# Patient Record
Sex: MALE | Race: WHITE | HISPANIC OR LATINO | Employment: FULL TIME | URBAN - METROPOLITAN AREA
[De-identification: names, ages, dates, MRNs, and addresses within clinical notes are randomized per-mention and may not be internally consistent; named-entity substitution may affect disease eponyms.]

---

## 2023-10-30 ENCOUNTER — HOSPITAL ENCOUNTER (EMERGENCY)
Facility: HOSPITAL | Age: 43
Discharge: HOME | End: 2023-10-30
Attending: EMERGENCY MEDICINE
Payer: OTHER MISCELLANEOUS

## 2023-10-30 ENCOUNTER — APPOINTMENT (EMERGENCY)
Dept: RADIOLOGY | Facility: HOSPITAL | Age: 43
End: 2023-10-30
Payer: OTHER MISCELLANEOUS

## 2023-10-30 VITALS
RESPIRATION RATE: 18 BRPM | OXYGEN SATURATION: 100 % | DIASTOLIC BLOOD PRESSURE: 78 MMHG | TEMPERATURE: 97.8 F | HEIGHT: 72 IN | HEART RATE: 77 BPM | SYSTOLIC BLOOD PRESSURE: 121 MMHG

## 2023-10-30 DIAGNOSIS — S49.90XA SHOULDER INJURY: Primary | ICD-10-CM

## 2023-10-30 LAB
ATRIAL RATE: 79 BPM
ATRIAL RATE: 79 BPM
P AXIS: 54 DEGREES
P AXIS: 57 DEGREES
PR INTERVAL: 160 MS
PR INTERVAL: 162 MS
QRS AXIS: 90 DEGREES
QRS AXIS: 92 DEGREES
QRSD INTERVAL: 92 MS
QRSD INTERVAL: 92 MS
QT INTERVAL: 382 MS
QT INTERVAL: 382 MS
QTC INTERVAL: 438 MS
QTC INTERVAL: 438 MS
T WAVE AXIS: 57 DEGREES
T WAVE AXIS: 59 DEGREES
VENTRICULAR RATE: 79 BPM
VENTRICULAR RATE: 79 BPM

## 2023-10-30 PROCEDURE — 99284 EMERGENCY DEPT VISIT MOD MDM: CPT

## 2023-10-30 PROCEDURE — 93005 ELECTROCARDIOGRAM TRACING: CPT

## 2023-10-30 PROCEDURE — 99283 EMERGENCY DEPT VISIT LOW MDM: CPT

## 2023-10-30 PROCEDURE — 73030 X-RAY EXAM OF SHOULDER: CPT

## 2023-10-30 PROCEDURE — 93010 ELECTROCARDIOGRAM REPORT: CPT | Performed by: INTERNAL MEDICINE

## 2023-10-30 RX ORDER — METHOCARBAMOL 500 MG/1
500 TABLET, FILM COATED ORAL 2 TIMES DAILY
Qty: 20 TABLET | Refills: 0 | Status: SHIPPED | OUTPATIENT
Start: 2023-10-30

## 2023-10-30 RX ORDER — LIDOCAINE 50 MG/G
1 PATCH TOPICAL ONCE
Status: DISCONTINUED | OUTPATIENT
Start: 2023-10-30 | End: 2023-10-30 | Stop reason: HOSPADM

## 2023-10-30 RX ORDER — METHOCARBAMOL 500 MG/1
500 TABLET, FILM COATED ORAL ONCE
Status: COMPLETED | OUTPATIENT
Start: 2023-10-30 | End: 2023-10-30

## 2023-10-30 RX ORDER — NAPROXEN 250 MG/1
250 TABLET ORAL ONCE
Status: COMPLETED | OUTPATIENT
Start: 2023-10-30 | End: 2023-10-30

## 2023-10-30 RX ADMIN — LIDOCAINE 1 PATCH: 50 PATCH CUTANEOUS at 12:53

## 2023-10-30 RX ADMIN — NAPROXEN 250 MG: 250 TABLET ORAL at 12:53

## 2023-10-30 RX ADMIN — METHOCARBAMOL TABLETS 500 MG: 500 TABLET, COATED ORAL at 12:53

## 2023-10-30 NOTE — Clinical Note
Staci Gomez was seen and treated in our emergency department on 10/30/2023. No work until cleared by Family Doctor/Orthopedics        Diagnosis:     Davidon  . He may return on this date: If you have any questions or concerns, please don't hesitate to call.       Jermaine Shah    ______________________________           _______________          _______________  Hospital Representative                              Date                                Time

## 2023-10-30 NOTE — Clinical Note
Vergia Drop was seen and treated in our emergency department on 10/30/2023. Diagnosis:     Gaviota Aveyr  may return to work on return date. He may return on this date: 10/31/2023         If you have any questions or concerns, please don't hesitate to call.       Estefani Loamx    ______________________________           _______________          _______________  Hospital Representative                              Date                                Time

## 2023-10-30 NOTE — ED PROVIDER NOTES
History  Chief Complaint   Patient presents with    Shoulder Injury     Pt reports L shoulder pain, describes as throbbing, starting this morning after lifting heavy boxes at work. Describes pain also in back near shoulder blade and L sided chest     24-year-old male presents to the ER for evaluation of shoulder pain. Patient stated around 10 AM this morning he was lifting boxes above his head when that heavy box fell backwards onto his left shoulder causing pain. Patient has pain primarily anteriorly. Worse with range of motion. Pain is described as constant and with intermittent pins-and-needles down to his fingers. Denies ecchymosis, erythema, noted deformity. No medication given prior to arrival.  Patient is right-hand dominant. History provided by:  Patient  Shoulder Injury  Associated symptoms: no back pain        None       Past Medical History:   Diagnosis Date    Depression        History reviewed. No pertinent surgical history. History reviewed. No pertinent family history. I have reviewed and agree with the history as documented. E-Cigarette/Vaping     E-Cigarette/Vaping Substances     Social History     Tobacco Use    Smoking status: Never     Passive exposure: Current    Smokeless tobacco: Never       Review of Systems   Respiratory:  Negative for cough and shortness of breath. Cardiovascular:  Negative for chest pain and palpitations. Gastrointestinal:  Negative for abdominal pain, nausea and vomiting. Musculoskeletal:  Positive for arthralgias (left shoulder). Negative for back pain. Skin:  Negative for color change and rash. Neurological:  Negative for dizziness, syncope and headaches. All other systems reviewed and are negative. Physical Exam  Physical Exam  Vitals and nursing note reviewed. Constitutional:       General: He is not in acute distress. Appearance: He is well-developed. HENT:      Head: Normocephalic and atraumatic.       Right Ear: External ear normal.      Left Ear: External ear normal.   Eyes:      Conjunctiva/sclera: Conjunctivae normal.   Cardiovascular:      Rate and Rhythm: Normal rate and regular rhythm. Pulses: Normal pulses. Heart sounds: Normal heart sounds. Pulmonary:      Effort: Pulmonary effort is normal. No respiratory distress. Breath sounds: Normal breath sounds. Abdominal:      Palpations: Abdomen is soft. Musculoskeletal:         General: No swelling. Left shoulder: Tenderness present. No swelling or deformity. Decreased range of motion. Normal strength. Normal pulse. Cervical back: Neck supple. Skin:     General: Skin is warm and dry. Capillary Refill: Capillary refill takes less than 2 seconds. Neurological:      Mental Status: He is alert and oriented to person, place, and time. Mental status is at baseline. Psychiatric:         Mood and Affect: Mood normal.         Behavior: Behavior normal.         Vital Signs  ED Triage Vitals [10/30/23 1130]   Temperature Pulse Respirations Blood Pressure SpO2   97.8 °F (36.6 °C) 77 18 144/76 100 %      Temp Source Heart Rate Source Patient Position - Orthostatic VS BP Location FiO2 (%)   Temporal Monitor Sitting Left arm --      Pain Score       8           Vitals:    10/30/23 1130 10/30/23 1335   BP: 144/76 121/78   Pulse: 77 77   Patient Position - Orthostatic VS: Sitting          Visual Acuity      ED Medications  Medications   methocarbamol (ROBAXIN) tablet 500 mg (500 mg Oral Given 10/30/23 1253)   naproxen (NAPROSYN) tablet 250 mg (250 mg Oral Given 10/30/23 1253)       Diagnostic Studies  Results Reviewed       None                   XR shoulder 2+ views LEFT   Final Result by Olga Olivas MD (10/30 1610)      No acute osseous abnormality. I have personally reviewed this study including all images.  / R.J.F. Resident: Miguel A Gutierrez, the attending radiologist, have reviewed the images and agree with the final report above. Workstation performed: [id number]                    Procedures  Procedures         ED Course                               SBIRT 22yo+      Flowsheet Row Most Recent Value   Initial Alcohol Screen: US AUDIT-C     1. How often do you have a drink containing alcohol? 0 Filed at: 10/30/2023 1132   2. How many drinks containing alcohol do you have on a typical day you are drinking? 0 Filed at: 10/30/2023 1132   3a. Male UNDER 65: How often do you have five or more drinks on one occasion? 0 Filed at: 10/30/2023 1132   3b. FEMALE Any Age, or MALE 65+: How often do you have 4 or more drinks on one occassion? 0 Filed at: 10/30/2023 1132   Audit-C Score 0 Filed at: 10/30/2023 1132   DORINDA: How many times in the past year have you. .. Used an illegal drug or used a prescription medication for non-medical reasons? Never Filed at: 10/30/2023 1132                      Medical Decision Making  27-year-old male patient presents to the ER for evaluation of shoulder pain. Patient stated that he was at work approximately 10 AM when he was lifting heavy boxes and a heavy box fell back onto left shoulder causing anterior shoulder pain. Patient had an x-ray which showed no osseous abnormalities. Advised patient to follow-up with orthopedics for further evaluation of possible ligaments injury. Tylenol and Motrin for pain. Robaxin given for muscle spasm and muscle pain. Return the ER if symptoms worsens or questions or concerns arise at home. Amount and/or Complexity of Data Reviewed  Radiology: ordered. Risk  Prescription drug management.              Disposition  Final diagnoses:   Shoulder injury     Time reflects when diagnosis was documented in both MDM as applicable and the Disposition within this note       Time User Action Codes Description Comment    10/30/2023  1:34 PM Mei Ceron Add [S49.90XA] Shoulder injury           ED Disposition       ED Disposition   Discharge    Condition   Stable    Date/Time   Mon Oct 30, 2023 [address] [place] [address] Drive discharge to home/self care. Follow-up Information       Follow up With Specialties Details Why Contact Info Additional [address], DO Orthopedic Surgery   [address] [facility]  [address]  [facility] [address]  [phone]       [address] [place] [address] Emergency Department Emergency Medicine   [facility] [id number] [address] [place] [address] Emergency Department, [place], [place], [facility]            There are no discharge medications for this patient. No discharge procedures on file.     PDMP Review       None            ED Provider  Electronically Signed by             ODILIA Villa  10/30/23 5992

## 2023-11-16 ENCOUNTER — APPOINTMENT (OUTPATIENT)
Dept: RADIOLOGY | Facility: CLINIC | Age: 43
End: 2023-11-16

## 2023-11-16 VITALS
WEIGHT: 207.2 LBS | HEIGHT: 72 IN | BODY MASS INDEX: 28.06 KG/M2 | DIASTOLIC BLOOD PRESSURE: 80 MMHG | SYSTOLIC BLOOD PRESSURE: 133 MMHG | HEART RATE: 89 BPM

## 2023-11-16 DIAGNOSIS — M62.838 TRAPEZIUS MUSCLE SPASM: ICD-10-CM

## 2023-11-16 DIAGNOSIS — G54.9 NERVE ROOT IRRITATION: ICD-10-CM

## 2023-11-16 DIAGNOSIS — M62.838 TRAPEZIUS MUSCLE SPASM: Primary | ICD-10-CM

## 2023-11-16 PROCEDURE — 99204 OFFICE O/P NEW MOD 45 MIN: CPT | Performed by: ORTHOPAEDIC SURGERY

## 2023-11-16 PROCEDURE — 72040 X-RAY EXAM NECK SPINE 2-3 VW: CPT

## 2023-11-16 RX ORDER — METHYLPREDNISOLONE 4 MG/1
TABLET ORAL
Qty: 21 TABLET | Refills: 0 | Status: SHIPPED | OUTPATIENT
Start: 2023-11-16

## 2023-11-16 NOTE — PROGRESS NOTES
Patient Name:  Divya Ozuna  MRN:  [id number]    [address] [facility]     1. Trapezius muscle spasm  -     Ambulatory Referral to Occupational Medicine; Future  -     Ambulatory Referral to Physical Therapy; Future    2. Nerve root irritation  -     Ambulatory Referral to Occupational Medicine; Future  -     Ambulatory Referral to Physical Therapy; Future      Upper trapezius muscle spasm and suspected cervical nerve root irritation s/p work related injury 10/30/23  The patient's x-rays were reviewed today. The patient was referred to physical therapy. He is encouraged to perform home exercises. Home exercises were provided and demonstrated in the office today. The patient was referred to occupational medicine for evaluation and further work restrictions if needed. The following work restrictions were provided: may return to restricted duty on 11/20/23. Limitations include: no lifting greater than 5 lbs above his waist, no overhead lifting, no pushing or pulling greater than 5 lbs, no climbing ladders for the following 2 weeks. In 2 weeks no lifting greater than 30 lbs. In 4 weeks resume full unrestricted duty. The patient was provided a Medrol Dosepak. Potential risks and benefits were discussed in detail. He was advised not to use NSAID's while taking. I will see the patient back in approximately 6-8 weeks for re-evaluation. If his symptoms have resolved at this point he may cancel his appointment. Chief Complaint     Left shoulder pain    History of the Present Illness     Mauricio Webb is a RHD 38 y.o. male with Left shoulder pain. The patient was at work lifting heavy boxes overhead and noted mild discomfort. He notes he reported to his supervisor following this stating there was discomfort in his shoulder. He continued to work. He lifted another heavy box and had sharp pains into his shoulder.  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 Boxes can be upwards of 50-75 lbs. The patient's injury occurred on 10/30/23. He was then treated in the emergency room where he was provided methocarbamol upon discharge. He has not returned to work since this incident. Pain is located at the anterior shoulder. He has pain at the inferior scapula. He notes numbness and tingling into his digits. Pain is rated 8/10. Pain has not improved. Review of Systems     Review of Systems   Constitutional:  Negative for appetite change and unexpected weight change. HENT:  Negative for congestion and trouble swallowing. Eyes:  Negative for visual disturbance. Respiratory:  Negative for cough and shortness of breath. Cardiovascular:  Negative for chest pain and palpitations. Gastrointestinal:  Negative for nausea and vomiting. Endocrine: Negative for cold intolerance and heat intolerance. Musculoskeletal:  Positive for arthralgias. Negative for joint swelling and myalgias. Skin:  Negative for rash. Neurological:  Negative for numbness. Physical Exam     /80   Pulse 89   Ht 6' (1.829 m)   Wt 94 kg (207 lb 3.2 oz)   BMI 28.10 kg/m²     Left  Shoulder: Active range of motion   100 degrees forward flexion  100 degrees abduction  60 degrees external rotation   L1 internal rotation    Passive range of motion   160 degrees of forward flexion     There is 5/5 strength with supraspinatus testing. There is 5/5 strength with infraspinatus testing. There is 5/5 strength with subscapularis testing. The patient is neurovascularly intact distally in the extremity. Left Elbow    Active range of motion  0 to 130 degrees  Full composite fist  Neurovascularly intact distally    Cervical Spine:   Active range of motion mild restriction with rotation to the right. There is no midline tenderness. Trapezius and rhomboid tenderness. There is on left paraspinal hypertonicity and tenderness.     Sensation decreased to light touch C5 through C7 dermatomes left upper extremity. Sensation intact to light touch C5 through T1 dermatomes right upper extremity. Left Motor: 5/5 biceps, 5/5 triceps, 5/5 wrist extension, 5/5 finger flexion, 5/5 finger abduction   Right Motor: 5/5 biceps, 5/5 triceps, 5/5 wrist extension, 5/5 finger flexion, 5/5 finger abduction   Spurling test is  negative  Suresh's sign negative      Eyes:  Anicteric sclerae. Neck:  Supple. Lungs:  Normal respiratory effort. Cardiovascular:  Capillary refill is less than 2 seconds. Skin:  Intact without erythema. Neurologic:  Sensation grossly intact to light touch. Psychiatric:  Mood and affect are appropriate. Data Review     I have personally reviewed pertinent films in PACS, and my interpretation follows:    X-rays taken 10/30/23 of Left shoulder demonstrate well preserved joint spaces. No acute fracture or osseous abnormality. X-rays taken 11/16/23 of cervical spine demonstrate anterior osteophyte at C5-6. Mild degenerative changes C5-6 and C6-7. Mild loss of lordosis with subtle straightening. No acute fracture or osseous abnormality noted. Past Medical History:   Diagnosis Date    Depression        History reviewed. No pertinent surgical history. No Known Allergies    Current Outpatient Medications on File Prior to Visit   Medication Sig Dispense Refill    methocarbamol (ROBAXIN) 500 mg tablet Take 1 tablet (500 mg total) by mouth 2 (two) times a day 20 tablet 0     No current facility-administered medications on file prior to visit. Social History     Tobacco Use    Smoking status: Never     Passive exposure: Current    Smokeless tobacco: Never       History reviewed. No pertinent family history. Procedures Performed     Procedures  None performed.        Beata Laboy  Scribe Attestation      I,:  Beata Laboy am acting as a scribe while in the presence of the attending physician.:       I,:  Alpesh Morin DO personally performed the services described in this documentation    as scribed in my presence.:

## 2023-11-16 NOTE — LETTER
November 16, 2023     Patient: Julio Roberts  YOB: 1980  Date of Visit: 11/16/2023      To Whom it May Concern:    Renetta Huan is under my professional care. Gladys Ordonez was seen in my office on 11/16/2023. Gladys Ordonez may return to restricted duty on 11/20/23. Limitations include: no lifting greater than 5 lbs above his waist, no overhead lifting, no pushing or pulling greater than 5 lbs, no climbing ladders for the following 2 weeks. In 2 weeks no lifting greater than 30 lbs. In 4 weeks resume full unrestricted duty. If you have any questions or concerns, please don't hesitate to call.          Sincerely,          Mayo Salvador DO        CC: No Recipients

## 2023-11-28 ENCOUNTER — EVALUATION (OUTPATIENT)
Dept: PHYSICAL THERAPY | Facility: CLINIC | Age: 43
End: 2023-11-28
Payer: OTHER MISCELLANEOUS

## 2023-11-28 DIAGNOSIS — M25.512 ACUTE PAIN OF LEFT SHOULDER: ICD-10-CM

## 2023-11-28 DIAGNOSIS — M62.838 TRAPEZIUS MUSCLE SPASM: Primary | ICD-10-CM

## 2023-11-28 DIAGNOSIS — G54.9 NERVE ROOT IRRITATION: ICD-10-CM

## 2023-11-28 PROCEDURE — 97140 MANUAL THERAPY 1/> REGIONS: CPT | Performed by: PHYSICAL THERAPIST

## 2023-11-28 PROCEDURE — 97112 NEUROMUSCULAR REEDUCATION: CPT | Performed by: PHYSICAL THERAPIST

## 2023-11-28 PROCEDURE — 97161 PT EVAL LOW COMPLEX 20 MIN: CPT | Performed by: PHYSICAL THERAPIST

## 2023-11-28 NOTE — PROGRESS NOTES
PT Evaluation     Today's date: 2023  Patient name: Miesha Lockhart  : 1980  MRN: [id number]  Referring provider: John Arevalo DO  Dx:   Encounter Diagnosis     ICD-10-CM    1. Trapezius muscle spasm  M62.838 Ambulatory Referral to Physical Therapy      2. Nerve root irritation  G54.9 Ambulatory Referral to Physical Therapy      3. Acute pain of left shoulder  M25.512           Start Time: 1500  Stop Time: 1545  Total time in clinic (min): 45 minutes    Assessment  Assessment details: Pt is a 40 y/o male presenting to physical therapy with L shoulder/cervical pain with numbness/tingling down the LUE. Upon examination, pt presents with impairments of decreased strength, decreased ROM, and increased pain of pt's L shoulder and cervical spine resulting in limitations of self-care/ADLs, work activities, lifting objects, and reaching overhead. Pt plan of care will focus on improving strength and ROM of pt's L shoulder and cervical spine as well as decreasing pain and improving tolerance to functional activities. Pt would benefit from skilled physical therapy to facilitate a return to his prior level of function. Impairments: abnormal or restricted ROM, activity intolerance, impaired physical strength, lacks appropriate home exercise program and pain with function  Functional limitations: self-care/ADLs, work activities, lifting objects, and reaching overheadUnderstanding of Dx/Px/POC: good   Prognosis: good    Goals  STG - 4 weeks  1. Pt will have a subjective decrease in pain of pt's L shoulder by 2 levels or more during performance of reaching overhead  2. Pt will demonstrate WNL AROM of pt's L shoulder in all planes to facilitate a return to his prior level of function    LTG - 8 weeks  1. Pt will demonstrate 4+/5 gross strength of his L shoulder in all planes to facilitate a return to his prior level of function  2.  Pt's FOTO score will improve by 10 points or better to facilitate a return to pt's prior level of function. Plan  Patient would benefit from: PT eval and skilled physical therapy  Planned modality interventions: cryotherapy, thermotherapy: hydrocollator packs and unattended electrical stimulation  Planned therapy interventions: activity modification, ADL training, behavior modification, flexibility, functional ROM exercises, graded exercise, home exercise program, IASTM, joint mobilization, kinesiology taping, massage, Oconnell taping, manual therapy, neuromuscular re-education, patient education, postural training, self care, strengthening, stretching, therapeutic activities and therapeutic exercise  Frequency: 2x week  Duration in weeks: 8  Plan of Care beginning date: 2023  Plan of Care expiration date: 2024  Treatment plan discussed with: patient        Subjective Evaluation    History of Present Illness  Mechanism of injury: Pt is a 40 y/o male presenting to physical therapy with L shoulder/cervical pain with numbness/tingling down the LUE. On , pt reports he was putting a heavy box overhead when he felt a significant pain into his L shoulder causing his LUE to lock. Pt reports going to the ER that same day where he received muscle relaxers and x-ray at the hospital. Pt reports then going to see Dr. Filiberto Hudson two weeks later where he received a steroid. Pt reports he will have increased pain with reaching back, overhead, and laying on L or R side. Pt reports he will have numbness/tingling in his LUE that will increase with movement. Pt reports he will have decreased pain with rest and small movements of his LUE. Pt reports he is also having weakness throughout his LUE.    Patient Goals  Patient goals for therapy: decreased pain, increased motion and increased strength    Pain  Current pain ratin  At best pain ratin  At worst pain ratin  Location: L shoulder  Quality: dull ache and sharp  Relieving factors: rest and medications  Aggravating factors: overhead activity and lifting    Treatments  Previous treatment: medication  Current treatment: medication and physical therapy        Objective     Palpation   Left   Tenderness of the lower trapezius, middle trapezius, rhomboids and upper trapezius. Tenderness     Left Shoulder   Tenderness in the acromion, coracoid process, medial scapula and supraspinatus tendon.      Neurological Testing     Sensation     Shoulder   Left Shoulder   Intact: light touch, pin prick and sharp/dull discrimination    Right Shoulder   Intact: Light touch, pin prick and sharp/dull discrimination    Reflexes   Left   Biceps (C5/C6): normal (2+)  Brachioradialis (C6): normal (2+)  Triceps (C7): normal (2+)    Right   Biceps (C5/C6): normal (2+)  Brachioradialis (C6): normal (2+)  Triceps (C7): normal (2+)    Active Range of Motion   Cervical/Thoracic Spine       Cervical    Flexion: 60 degrees  Restriction level: minimal  Extension: 60 degrees     with pain Restriction level: minimal  Left lateral flexion: 20 degrees     Restriction level: minimal  Right lateral flexion: 20 degrees     with pain Restriction level minimal  Left rotation: 70 degrees Restriction level: minimal  Right rotation: 70 degrees    Restriction level: minimal  Left Shoulder   Flexion: 135 degrees with pain  Abduction: 105 degrees with pain  External rotation 45°: 55 degrees with pain  External rotation BTH: C4 with pain  Internal rotation 45°: 65 degrees with pain  Internal rotation BTB: sacrum with pain    Right Shoulder   Normal active range of motion    Scapular Mobility   Left Shoulder   Scapular mobility: fair  Scapular Mobility with Shoulder to 90° FF   Upward rotation: inadequate    Scapular Mobility beyond 90° FF   Upward rotation: inadequate    Strength/Myotome Testing   Cervical Spine   Neck extension: 4-  Neck flexion: 4-    Left   Neck lateral flexion (C3): 4-    Right   Neck lateral flexion (C3): 4-    Left Shoulder     Planes of Motion Flexion: 3-   Abduction: 2+   External rotation at 0°: 3+   Internal rotation at 0°: 3     Left Elbow   Flexion: 3+  Extension: 3+             Precautions: N/A    POC expires Unit limit Auth Expiration date PT/OT + Visit Limit?    1/23 N/A 1/23 BOMN                 Visit/Unit Tracking  11/28                                            FOTO        Manuals 11/28            GH distraction STEPHANI            L post/inf shoulder mobs STEPHANI            L scapula PROM STEPHANI                         Neuro Re-Ed             Education on POC, diagnosis, and HEP 5'            Scapular retraction 1x10            Cervical retraction 1x10                                                                Ther Ex             Pullies flex/abd 2' ea            Wall slides 1x10                                                                                          Ther Activity                                       Gait Training                                       Modalities

## 2023-11-30 ENCOUNTER — OFFICE VISIT (OUTPATIENT)
Dept: PHYSICAL THERAPY | Facility: CLINIC | Age: 43
End: 2023-11-30
Payer: OTHER MISCELLANEOUS

## 2023-11-30 DIAGNOSIS — M62.838 TRAPEZIUS MUSCLE SPASM: Primary | ICD-10-CM

## 2023-11-30 DIAGNOSIS — M25.512 ACUTE PAIN OF LEFT SHOULDER: ICD-10-CM

## 2023-11-30 DIAGNOSIS — G54.9 NERVE ROOT IRRITATION: ICD-10-CM

## 2023-11-30 PROCEDURE — 97112 NEUROMUSCULAR REEDUCATION: CPT

## 2023-11-30 PROCEDURE — 97140 MANUAL THERAPY 1/> REGIONS: CPT

## 2023-11-30 PROCEDURE — 97110 THERAPEUTIC EXERCISES: CPT

## 2023-11-30 NOTE — PROGRESS NOTES
Daily Note     Today's date: 2023  Patient name: Lokesh Lombardi  : 1980  MRN: [id number]  Referring provider: Catherine Moon DO  Dx:   Encounter Diagnosis     ICD-10-CM    1. Trapezius muscle spasm  M62.838       2. Nerve root irritation  G54.9       3. Acute pain of left shoulder  M25.512                      Subjective: Pt reports not feeling too much right now. Objective: See treatment diary below      Assessment: Tolerated treatment fair. He reports he feels increase in scapular pain along the inferior angle of the scapula. Attempted median nerve glides a couple different ways and the only way that didn't cause increased pain was with his shoulder in neutral and focusing on elbow/forearm/wrist motion. He tolerates other exercises fair, with some sx aggravation noted at times. He follows cues for proper exercise performance. Patient demonstrated fatigue post treatment, exhibited good technique with therapeutic exercises, and would benefit from continued PT      Plan: Continue per plan of care. Precautions: N/A    POC expires Unit limit Auth Expiration date PT/OT + Visit Limit?     N/A  BOMN                 Visit/Unit Tracking                                             FOTO        Manuals            GH distraction STEPHANI KT           L post/inf shoulder mobs STEPHANI            L scapula PROM STEPHANI KT                        Neuro Re-Ed             Education on POC, diagnosis, and HEP 5' 4'           Scapular retraction 1x10 2x10           Cervical retraction 1x10 1x10           Median nerve glides gentle  1x10                                                   Ther Ex             Pullies flex/abd 2' ea 2' ea           Wall slides flex/scap 1x10 1x10           UBE  2'/2'           Seated rhomboid stretch  20"x3           Sidelying scapular clocks  2x10                                                  Ther Activity                                       Gait Training Modalities

## 2023-12-05 ENCOUNTER — OFFICE VISIT (OUTPATIENT)
Dept: PHYSICAL THERAPY | Facility: CLINIC | Age: 43
End: 2023-12-05
Payer: OTHER MISCELLANEOUS

## 2023-12-05 DIAGNOSIS — M62.838 TRAPEZIUS MUSCLE SPASM: Primary | ICD-10-CM

## 2023-12-05 DIAGNOSIS — G54.9 NERVE ROOT IRRITATION: ICD-10-CM

## 2023-12-05 DIAGNOSIS — M25.512 ACUTE PAIN OF LEFT SHOULDER: ICD-10-CM

## 2023-12-05 PROCEDURE — 97112 NEUROMUSCULAR REEDUCATION: CPT | Performed by: PHYSICAL THERAPIST

## 2023-12-05 PROCEDURE — 97140 MANUAL THERAPY 1/> REGIONS: CPT | Performed by: PHYSICAL THERAPIST

## 2023-12-05 PROCEDURE — 97110 THERAPEUTIC EXERCISES: CPT | Performed by: PHYSICAL THERAPIST

## 2023-12-05 NOTE — PROGRESS NOTES
Daily Note     Today's date: 2023  Patient name: Julio Roberts  : 1980  MRN: [id number]  Referring provider: Mayo Salvador DO  Dx:   Encounter Diagnosis     ICD-10-CM    1. Trapezius muscle spasm  M62.838       2. Nerve root irritation  G54.9       3. Acute pain of left shoulder  M25.512           Start Time: 1545  Stop Time: 1630  Total time in clinic (min): 45 minutes    Subjective: Pt reports doing well after his last therapy session with decreased pain and improved motion of his L shoulder. Objective: See treatment diary below      Assessment: Tolerated treatment well. Patient demonstrated fatigue post treatment, exhibited good technique with therapeutic exercises, and would benefit from continued PT. Pt was able to progress today with inclusion of sidelying ER and abduction into pt's exercise program. Pt required min verbal cues to facilitate performance of proper technique. Assess pt response to treatment at next visit. Plan: Continue per plan of care. Precautions: N/A    POC expires Unit limit Auth Expiration date PT/OT + Visit Limit?     N/A  BOMN                 Visit/Unit Tracking                                            FOTO        Manuals           GH distraction STEPHANI KT STEPHANI          L post/inf shoulder mobs STEPHNAI  STEPHANI          L scapula PROM STEPHANI KT                        Neuro Re-Ed             Pt education 5' 4' 4'          Scapular retraction 1x10 2x10 2x10          Cervical retraction 1x10 1x10 2x10          Median nerve glides gentle  1x10  2x10          Sidelying abduction   2x10 2#                                    Ther Ex             Pullies flex/abd 2' ea 2' ea 2' ea          Wall slides flex/scap 1x10 1x10 1x10          UBE  2'/2' 5'          Seated rhomboid stretch  20"x3           Sidelying scapular clocks  2x10           Finger ladder abd   1x10          Sidelying ER   2x10 2#                       Ther Activity Gait Training                                       Modalities

## 2023-12-07 ENCOUNTER — OFFICE VISIT (OUTPATIENT)
Dept: PHYSICAL THERAPY | Facility: CLINIC | Age: 43
End: 2023-12-07
Payer: OTHER MISCELLANEOUS

## 2023-12-07 DIAGNOSIS — M25.512 ACUTE PAIN OF LEFT SHOULDER: Primary | ICD-10-CM

## 2023-12-07 DIAGNOSIS — M62.838 TRAPEZIUS MUSCLE SPASM: ICD-10-CM

## 2023-12-07 DIAGNOSIS — G54.9 NERVE ROOT IRRITATION: ICD-10-CM

## 2023-12-07 PROCEDURE — 97110 THERAPEUTIC EXERCISES: CPT

## 2023-12-07 PROCEDURE — 97112 NEUROMUSCULAR REEDUCATION: CPT

## 2023-12-07 PROCEDURE — 97140 MANUAL THERAPY 1/> REGIONS: CPT

## 2023-12-07 NOTE — PROGRESS NOTES
Daily Note     Today's date: 2023  Patient name: Jose Chinchilla  : 1980  MRN: [id number]  Referring provider: Skylar Amaral DO  Dx:   Encounter Diagnosis     ICD-10-CM    1. Acute pain of left shoulder  M25.512       2. Nerve root irritation  G54.9       3. Trapezius muscle spasm  M62.838           Start Time: 1545  Stop Time: 1630  Total time in clinic (min): 45 minutes    Subjective: Patient states L shoulder is hurting. He states that he has just come off of working. Objective: See treatment diary below      Assessment: Tolerated treatment well. Cues utilized t/o session to maximize scap activation with activities. Patient continues with good response to manuals. Patient demonstrated fatigue post treatment and would benefit from continued PT to improve scapular strength and UE function. Plan: Continue per plan of care. Precautions: N/A    POC expires Unit limit Auth Expiration date PT/OT + Visit Limit?     N/A  BOMN                 Visit/Unit Tracking                                           FOTO        Manuals          GH distraction STEPHANI KT STEPHANI LQ         L post/inf shoulder mobs STEPHANI  STEPHANI STEPHANI         L scapula PROM STEPHANI KT  LQ                      Neuro Re-Ed             Pt education 5' 4' 4'          Scapular retraction 1x10 2x10 2x10 2x10         Cervical retraction 1x10 1x10 2x10 2x10         Median nerve glides gentle  1x10  2x10 2x10 ea         Sidelying abduction   2x10 2# 2x10 2#                                   Ther Ex             Pullies flex/abd 2' ea 2' ea 2' ea 2' ea         Wall slides flex/scap 1x10 1x10 1x10 x10ea         UBE  2'/2' 5' 2'/2'         Seated rhomboid stretch  20"x3           Sidelying scapular clocks  2x10           Finger ladder abd   1x10 X10ea flex/ abd         Sidelying ER   2x10 2# 2x10 2#                      Ther Activity                                       Gait Training Modalities

## 2023-12-12 ENCOUNTER — OFFICE VISIT (OUTPATIENT)
Dept: PHYSICAL THERAPY | Facility: CLINIC | Age: 43
End: 2023-12-12
Payer: OTHER MISCELLANEOUS

## 2023-12-12 DIAGNOSIS — G54.9 NERVE ROOT IRRITATION: ICD-10-CM

## 2023-12-12 DIAGNOSIS — M25.512 ACUTE PAIN OF LEFT SHOULDER: Primary | ICD-10-CM

## 2023-12-12 DIAGNOSIS — M62.838 TRAPEZIUS MUSCLE SPASM: ICD-10-CM

## 2023-12-12 PROCEDURE — 97110 THERAPEUTIC EXERCISES: CPT | Performed by: PHYSICAL THERAPIST

## 2023-12-12 PROCEDURE — 97140 MANUAL THERAPY 1/> REGIONS: CPT | Performed by: PHYSICAL THERAPIST

## 2023-12-12 PROCEDURE — 97112 NEUROMUSCULAR REEDUCATION: CPT | Performed by: PHYSICAL THERAPIST

## 2023-12-12 NOTE — PROGRESS NOTES
Daily Note     Today's date: 2023  Patient name: Latonya Sheets  : 1980  MRN: [id number]  Referring provider: Martina Suazo DO  Dx:   Encounter Diagnosis     ICD-10-CM    1. Acute pain of left shoulder  M25.512       2. Nerve root irritation  G54.9       3. Trapezius muscle spasm  M62.838           Start Time: 1545  Stop Time: 1630  Total time in clinic (min): 45 minutes    Subjective: Pt reports he continues to have improved motion and decreased pain of his L shoulder. Objective: See treatment diary below      Assessment: Tolerated treatment well. Patient demonstrated fatigue post treatment, exhibited good technique with therapeutic exercises, and would benefit from continued PT. Pt was able to progress today by increasing resistance for his sidelying shoulder abduction and ER exercises. Pt required min verbal cues to facilitate performance of proper technique. Assess pt response to treatment at next visit. Plan: Continue per plan of care. Precautions: N/A    POC expires Unit limit Auth Expiration date PT/OT + Visit Limit?     N/A  BOMN                 Visit/Unit Tracking   FOTO                                        FOTO, done on         Manuals         GH distraction STEPHANI KT STEPHANI LQ STEPHANI        L post/inf shoulder mobs STEPHANI  STEPHANI STEPHANI STEPHANI        L scapula PROM STEPHANI KT  LQ                      Neuro Re-Ed             Pt education 5' 4' 4'  4'        Scapular retraction 1x10 2x10 2x10 2x10 2x10        Cervical retraction 1x10 1x10 2x10 2x10 2x10        Median nerve glides gentle  1x10  2x10 2x10 ea 2x10 ea        Sidelying abduction   2x10 2# 2x10 2# 2x10 3#                                  Ther Ex             Pullies flex/abd 2' ea 2' ea 2' ea 2' ea 2' ea        Wall slides flex/scap 1x10 1x10 1x10 x10ea X20 ea        UBE  2'/2' 5' 2'/2' 2'/2'        Seated rhomboid stretch  20"x3           Sidelying scapular clocks  2x10 2x10        Finger ladder abd   1x10 X10ea flex/ abd         Sidelying ER   2x10 2# 2x10 2# 2x10 3#                     Ther Activity                                       Gait Training                                       Modalities

## 2023-12-14 ENCOUNTER — APPOINTMENT (OUTPATIENT)
Dept: PHYSICAL THERAPY | Facility: CLINIC | Age: 43
End: 2023-12-14
Payer: OTHER MISCELLANEOUS

## 2023-12-19 ENCOUNTER — OFFICE VISIT (OUTPATIENT)
Dept: PHYSICAL THERAPY | Facility: CLINIC | Age: 43
End: 2023-12-19
Payer: OTHER MISCELLANEOUS

## 2023-12-19 DIAGNOSIS — M62.838 TRAPEZIUS MUSCLE SPASM: ICD-10-CM

## 2023-12-19 DIAGNOSIS — G54.9 NERVE ROOT IRRITATION: ICD-10-CM

## 2023-12-19 DIAGNOSIS — M25.512 ACUTE PAIN OF LEFT SHOULDER: Primary | ICD-10-CM

## 2023-12-19 PROCEDURE — 97110 THERAPEUTIC EXERCISES: CPT | Performed by: PHYSICAL THERAPIST

## 2023-12-19 PROCEDURE — 97112 NEUROMUSCULAR REEDUCATION: CPT | Performed by: PHYSICAL THERAPIST

## 2023-12-19 PROCEDURE — 97140 MANUAL THERAPY 1/> REGIONS: CPT | Performed by: PHYSICAL THERAPIST

## 2023-12-19 NOTE — PROGRESS NOTES
"Daily Note     Today's date: 2023  Patient name: Mauricio Galvan  : 1980  MRN: [id number]  Referring provider: Joe Collins DO  Dx:   Encounter Diagnosis     ICD-10-CM    1. Acute pain of left shoulder  M25.512       2. Nerve root irritation  G54.9       3. Trapezius muscle spasm  M62.838                      Subjective: Pt reports that he is getting better. Notes that he gets sore after his therapy sessions.       Objective: See treatment diary below      Assessment: Tolerated treatment well. Patient demonstrated fatigue post treatment, exhibited good technique with therapeutic exercises, and would benefit from continued PT. He continued to fatigue with periscapular strengthening, consider adding TB rows NV to facilitate more strengthening.       Plan: Continue per plan of care.  Progress treatment as tolerated.       Precautions: N/A    POC expires Unit limit Auth Expiration date PT/OT + Visit Limit?    N/A  BOMN                 Visit/Unit Tracking   FOTO                                        FOTO, done on         Manuals        GH distraction STEPHANI KT STEPHANI LQ STEPHANI KB       L post/inf shoulder mobs STEPHANI  STEPHANI STEPHANI STEPHANI KB gr 4       L scapula PROM STEPHANI KT  LQ                      Neuro Re-Ed             Pt education 5' 4' 4'  4'        Scapular retraction 1x10 2x10 2x10 2x10 2x10 2x10       Cervical retraction 1x10 1x10 2x10 2x10 2x10 2x10       Median nerve glides gentle  1x10  2x10 2x10 ea 2x10 ea 2x10 ea       Sidelying abduction   2x10 2# 2x10 2# 2x10 3# 2x10 3#                                 Ther Ex             Pullies flex/abd 2' ea 2' ea 2' ea 2' ea 2' ea 2' ea       Wall slides flex/scap 1x10 1x10 1x10 x10ea X20 ea 20 ea       UBE  2'/2' 5' 2'/2' 2'/2' 2'/2'       Seated rhomboid stretch  20\"x3           Sidelying scapular clocks  2x10   2x10 2x10       Finger ladder abd   1x10 X10ea flex/ abd         Sidelying ER   2x10 " 2# 2x10 2# 2x10 3# 2x10 3#                    Ther Activity                                       Gait Training                                       Modalities

## 2023-12-21 ENCOUNTER — OFFICE VISIT (OUTPATIENT)
Dept: PHYSICAL THERAPY | Facility: CLINIC | Age: 43
End: 2023-12-21
Payer: OTHER MISCELLANEOUS

## 2023-12-21 DIAGNOSIS — M25.512 ACUTE PAIN OF LEFT SHOULDER: Primary | ICD-10-CM

## 2023-12-21 DIAGNOSIS — G54.9 NERVE ROOT IRRITATION: ICD-10-CM

## 2023-12-21 DIAGNOSIS — M62.838 TRAPEZIUS MUSCLE SPASM: ICD-10-CM

## 2023-12-21 PROCEDURE — 97140 MANUAL THERAPY 1/> REGIONS: CPT

## 2023-12-21 PROCEDURE — 97110 THERAPEUTIC EXERCISES: CPT

## 2023-12-21 PROCEDURE — 97112 NEUROMUSCULAR REEDUCATION: CPT

## 2023-12-21 NOTE — PROGRESS NOTES
"Daily Note     Today's date: 2023  Patient name: Mauricio Galvan  : 1980  MRN: [id number]  Referring provider: Joe Collins DO  Dx:   Encounter Diagnosis     ICD-10-CM    1. Acute pain of left shoulder  M25.512       2. Nerve root irritation  G54.9       3. Trapezius muscle spasm  M62.838                      Subjective: Patient reports some soreness in darcy-lateral shoulder. He has not noticed N&T in the past few days.       Objective: See treatment diary below      Assessment: Tolerated treatment well. Able to increase resistance with SL exercises with no compromise in form or pain reported. Added serratus punch to improve scapular stability. Patient demonstrated fatigue post treatment and would benefit from continued PT      Plan: Progress treatment as tolerated.       Precautions: N/A    POC expires Unit limit Auth Expiration date PT/OT + Visit Limit?    N/A  BOMN                 Visit/Unit Tracking   FOTO                                       FOTO, done on         Manuals       GH distraction STEPHANI KT STEPHANI LQ STEPHANI KB DELL      L post/inf shoulder mobs STEPHANI  STEPHANI STEPHANI STEPHANI KB gr 4 TB gr 4       L scapula PROM STEPHANI KT  LQ                      Neuro Re-Ed             Pt education 5' 4' 4'  4'        Scapular retraction 1x10 2x10 2x10 2x10 2x10 2x10 2x10      Cervical retraction 1x10 1x10 2x10 2x10 2x10 2x10 2x10  +trial ext 8x  Increased sx       Median nerve glides gentle  1x10  2x10 2x10 ea 2x10 ea 2x10 ea 2x10       Sidelying abduction   2x10 2# 2x10 2# 2x10 3# 2x10 3# 2x10 3#      Serratus punch       20x 3#                    Ther Ex             Pullies flex/abd 2' ea 2' ea 2' ea 2' ea 2' ea 2' ea 2' ea      Wall slides flex/scap 1x10 1x10 1x10 x10ea X20 ea 20 ea 20x ea      UBE  2'/2' 5' 2'/2' 2'/2' 2'/2' 2'/2'      Seated rhomboid stretch  20\"x3           Sidelying scapular clocks  2x10   2x10 2x10 2x10    "   Finger ladder abd   1x10 X10ea flex/ abd         Sidelying ER   2x10 2# 2x10 2# 2x10 3# 2x10 3# 2x10  4#                    Ther Activity                                       Gait Training                                       Modalities

## 2023-12-26 ENCOUNTER — OFFICE VISIT (OUTPATIENT)
Dept: PHYSICAL THERAPY | Facility: CLINIC | Age: 43
End: 2023-12-26
Payer: OTHER MISCELLANEOUS

## 2023-12-26 DIAGNOSIS — M25.512 ACUTE PAIN OF LEFT SHOULDER: Primary | ICD-10-CM

## 2023-12-26 DIAGNOSIS — G54.9 NERVE ROOT IRRITATION: ICD-10-CM

## 2023-12-26 DIAGNOSIS — M62.838 TRAPEZIUS MUSCLE SPASM: ICD-10-CM

## 2023-12-26 PROCEDURE — 97140 MANUAL THERAPY 1/> REGIONS: CPT

## 2023-12-26 PROCEDURE — 97112 NEUROMUSCULAR REEDUCATION: CPT

## 2023-12-26 PROCEDURE — 97110 THERAPEUTIC EXERCISES: CPT

## 2023-12-26 NOTE — PROGRESS NOTES
Daily Note     Today's date: 2023  Patient name: Mauricio Galvan  : 1980  MRN: [id number]  Referring provider: Joe Collins DO  Dx:   Encounter Diagnosis     ICD-10-CM    1. Acute pain of left shoulder  M25.512       2. Nerve root irritation  G54.9       3. Trapezius muscle spasm  M62.838                      Subjective: Patient offers no new complaints. States it is starting to feel better.       Objective: See treatment diary below      Assessment: Tolerated treatment well. Able to complete charted program with no increased pain or discomfort. He cont to lack full active abd although ROM does improve with manual interventions. Progressed scapular strengthening to rows and ext, no money's added for postural strengthening. Patient demonstrated fatigue post treatment and would benefit from continued PT      Plan: Progress treatment as tolerated.       Precautions: N/A    POC expires Unit limit Auth Expiration date PT/OT + Visit Limit?    N/A  BOMN                 Visit/Unit Tracking   FOTO                                      FOTO, done on         Manuals      GH distraction STEHPANI KT STEPHANI LQ STEPHANI KB DELL DELL     L post/inf shoulder mobs STEPHANI  STEPHANI STEPHANI STEPHANI KB gr 4 TB gr 4  STEPHANI gr 4     L scapula PROM STEPHANI KT  LQ                      Neuro Re-Ed             Pt education 5' 4' 4'  4'        Scapular retraction 1x10 2x10 2x10 2x10 2x10 2x10 2x10      Cervical retraction 1x10 1x10 2x10 2x10 2x10 2x10 2x10  +trial ext 8x  Increased sx  2x10     Median nerve glides gentle  1x10  2x10 2x10 ea 2x10 ea 2x10 ea 2x10  2x10 ea     Sidelying abduction   2x10 2# 2x10 2# 2x10 3# 2x10 3# 2x10 3# 2x10 3#      Serratus punch       20x 3#  20x  3#      No moneys        RTB   2x10                   Ther Ex             Pullies flex/abd 2' ea 2' ea 2' ea 2' ea 2' ea 2' ea 2' ea 2' abd     Wall slides flex/scap 1x10 1x10 1x10 x10ea X20  "ea 20 ea 20x ea      UBE  2'/2' 5' 2'/2' 2'/2' 2'/2' 2'/2' 2'/2'      Seated rhomboid stretch  20\"x3           Sidelying scapular clocks  2x10   2x10 2x10 2x10      Finger ladder abd   1x10 X10ea flex/ abd         Sidelying ER   2x10 2# 2x10 2# 2x10 3# 2x10 3# 2x10  4#  1x10 3#  1x10 4#      TB Rows        2x10 GTB blue    TB Ext        2x10  GTB     Ther Activity                                       Gait Training                                       Modalities                                                        "

## 2023-12-28 ENCOUNTER — OFFICE VISIT (OUTPATIENT)
Dept: PHYSICAL THERAPY | Facility: CLINIC | Age: 43
End: 2023-12-28
Payer: OTHER MISCELLANEOUS

## 2023-12-28 DIAGNOSIS — M25.512 ACUTE PAIN OF LEFT SHOULDER: Primary | ICD-10-CM

## 2023-12-28 DIAGNOSIS — G54.9 NERVE ROOT IRRITATION: ICD-10-CM

## 2023-12-28 DIAGNOSIS — M62.838 TRAPEZIUS MUSCLE SPASM: ICD-10-CM

## 2023-12-28 PROCEDURE — 97112 NEUROMUSCULAR REEDUCATION: CPT

## 2023-12-28 PROCEDURE — 97110 THERAPEUTIC EXERCISES: CPT

## 2023-12-28 PROCEDURE — 97140 MANUAL THERAPY 1/> REGIONS: CPT

## 2023-12-28 NOTE — PROGRESS NOTES
Daily Note     Today's date: 2023  Patient name: Mauricio Galvan  : 1980  MRN: [id number]  Referring provider: Joe Collins DO  Dx:   Encounter Diagnosis     ICD-10-CM    1. Acute pain of left shoulder  M25.512       2. Nerve root irritation  G54.9       3. Trapezius muscle spasm  M62.838           Start Time: 1545  Stop Time: 1630  Total time in clinic (min): 45 minutes    Subjective: Pt reports he has the same soreness he has had.       Objective: See treatment diary below      Assessment: Tolerated treatment well. He states he feels muscular fatigue with exercises but no increase in pain. Good effort noted throughout session. Min cueing needed for technique. He tolerates 3# weight for sidelying ER today. Patient demonstrated fatigue post treatment, exhibited good technique with therapeutic exercises, and would benefit from continued PT      Plan: Continue per plan of care.      Precautions: N/A    POC expires Unit limit Auth Expiration date PT/OT + Visit Limit?    N/A  BOMN                 Visit/Unit Tracking   FOTO                                     FOTO, done on         Manuals     GH distraction STEPHANI KT STEPHANI LQ STEPHANI KB DELL DELL KT    L post/inf shoulder mobs STEPHANI  STEPHANI STEPHANI STEPHANI KB gr 4 TB gr 4  STEPHANI gr 4 STEPHANI    L scapula PROM STEPHANI KT  LQ                      Neuro Re-Ed             Pt education 5' 4' 4'  4'        Scapular retraction 1x10 2x10 2x10 2x10 2x10 2x10 2x10  2x10    Cervical retraction 1x10 1x10 2x10 2x10 2x10 2x10 2x10  +trial ext 8x  Increased sx  2x10 2x10    Median nerve glides gentle  1x10  2x10 2x10 ea 2x10 ea 2x10 ea 2x10  2x10 ea 2x10 ea    Sidelying abduction   2x10 2# 2x10 2# 2x10 3# 2x10 3# 2x10 3# 2x10 3#  2x10 3#    Serratus punch       20x 3#  20x  3#  20x 3#    No moneys        RTB   2x10  RTB 2x10                 Ther Ex             Pullies flex/abd 2' ea 2' ea 2' ea 2'  "ea 2' ea 2' ea 2' ea 2' abd 2'ea    Wall slides flex/scap 1x10 1x10 1x10 x10ea X20 ea 20 ea 20x ea  20x scap    UBE  2'/2' 5' 2'/2' 2'/2' 2'/2' 2'/2' 2'/2'  2'/2'    Seated rhomboid stretch  20\"x3           Sidelying scapular clocks  2x10   2x10 2x10 2x10  2x10    Finger ladder abd   1x10 X10ea flex/ abd         Sidelying ER   2x10 2# 2x10 2# 2x10 3# 2x10 3# 2x10  4#  1x10 3#  1x10 4#  2x10 4#    TB Rows        2x10 GTB 2x10 blue    TB Ext        2x10  GTB 2x10 blue    Ther Activity                                       Gait Training                                       Modalities                                                          "

## 2024-01-02 ENCOUNTER — APPOINTMENT (OUTPATIENT)
Dept: PHYSICAL THERAPY | Facility: CLINIC | Age: 44
End: 2024-01-02
Payer: OTHER MISCELLANEOUS

## 2024-01-04 ENCOUNTER — OFFICE VISIT (OUTPATIENT)
Dept: PHYSICAL THERAPY | Facility: CLINIC | Age: 44
End: 2024-01-04
Payer: OTHER MISCELLANEOUS

## 2024-01-04 DIAGNOSIS — M62.838 TRAPEZIUS MUSCLE SPASM: ICD-10-CM

## 2024-01-04 DIAGNOSIS — M25.512 ACUTE PAIN OF LEFT SHOULDER: Primary | ICD-10-CM

## 2024-01-04 DIAGNOSIS — G54.9 NERVE ROOT IRRITATION: ICD-10-CM

## 2024-01-04 PROCEDURE — 97112 NEUROMUSCULAR REEDUCATION: CPT | Performed by: PHYSICAL THERAPIST

## 2024-01-04 PROCEDURE — 97140 MANUAL THERAPY 1/> REGIONS: CPT | Performed by: PHYSICAL THERAPIST

## 2024-01-04 PROCEDURE — 97110 THERAPEUTIC EXERCISES: CPT | Performed by: PHYSICAL THERAPIST

## 2024-01-04 NOTE — PROGRESS NOTES
Daily Note     Today's date: 2024  Patient name: Mauricio Galvan  : 1980  MRN: [id number]  Referring provider: Joe Collins DO  Dx:   Encounter Diagnosis     ICD-10-CM    1. Acute pain of left shoulder  M25.512       2. Nerve root irritation  G54.9       3. Trapezius muscle spasm  M62.838           Start Time: 1500  Stop Time: 1545  Total time in clinic (min): 45 minutes    Subjective: Pt reports he continues to feel better with improved strength and motion of his L shoulder.       Objective: See treatment diary below      Assessment: Tolerated treatment well. Patient demonstrated fatigue post treatment, exhibited good technique with therapeutic exercises, and would benefit from continued PT. Pt was able to progress today with inclusion of tband ER and punch ups into pt's exercise program. Pt required min verbal cues to facilitate performance of proper technique. Assess pt response to treatment at next visit.      Plan: Continue per plan of care.      Precautions: N/A    POC expires Unit limit Auth Expiration date PT/OT + Visit Limit?    N/A  BOMN                 Visit/Unit Tracking   FOTO                                    FOTO, done on         Manuals    GH distraction STEPHANI KT STEPHANI LQ STEPHANI KB DELL DELL KT STEPHANI   L post/inf shoulder mobs STEPHANI  STEPHANI STEPHANI STEPHANI KB gr 4 TB gr 4  STEPHANI gr 4 STEPHANI STEPHANI   L scapula PROM STEPHANI KT  LQ                      Neuro Re-Ed             Pt education 5' 4' 4'  4'        Scapular retraction 1x10 2x10 2x10 2x10 2x10 2x10 2x10  2x10 2x10   Cervical retraction 1x10 1x10 2x10 2x10 2x10 2x10 2x10  +trial ext 8x  Increased sx  2x10 2x10 2x10   Median nerve glides gentle  1x10  2x10 2x10 ea 2x10 ea 2x10 ea 2x10  2x10 ea 2x10 ea 2x10 ea   Sidelying abduction   2x10 2# 2x10 2# 2x10 3# 2x10 3# 2x10 3# 2x10 3#  2x10 3# 3x10 3#   Serratus punch       20x 3#  20x  3#  20x 3#    Tband  "punch up          2x10 grn                Ther Ex             Pullies flex/abd 2' ea 2' ea 2' ea 2' ea 2' ea 2' ea 2' ea 2' abd 2'ea 2' ea   Wall slides flex/scap 1x10 1x10 1x10 x10ea X20 ea 20 ea 20x ea  20x scap    UBE  2'/2' 5' 2'/2' 2'/2' 2'/2' 2'/2' 2'/2'  2'/2' 5'   Seated rhomboid stretch  20\"x3           Sidelying scapular clocks  2x10   2x10 2x10 2x10  2x10    Tband ER          2x10 blue   Sidelying ER   2x10 2# 2x10 2# 2x10 3# 2x10 3# 2x10  4#  1x10 3#  1x10 4#  2x10 4# 2x10 4#   Jacey Rows        2x10 GTB 2x10 blue 2x10 18#   Jacey Ext        2x10  GTB 2x10 blue 2x10 15#   Ther Activity                                       Gait Training                                       Modalities                                                            "

## 2024-01-09 ENCOUNTER — APPOINTMENT (OUTPATIENT)
Dept: PHYSICAL THERAPY | Facility: CLINIC | Age: 44
End: 2024-01-09
Payer: OTHER MISCELLANEOUS

## 2024-01-11 ENCOUNTER — OFFICE VISIT (OUTPATIENT)
Dept: PHYSICAL THERAPY | Facility: CLINIC | Age: 44
End: 2024-01-11
Payer: OTHER MISCELLANEOUS

## 2024-01-11 DIAGNOSIS — M62.838 TRAPEZIUS MUSCLE SPASM: ICD-10-CM

## 2024-01-11 DIAGNOSIS — G54.9 NERVE ROOT IRRITATION: ICD-10-CM

## 2024-01-11 DIAGNOSIS — M25.512 ACUTE PAIN OF LEFT SHOULDER: Primary | ICD-10-CM

## 2024-01-11 PROCEDURE — 97112 NEUROMUSCULAR REEDUCATION: CPT

## 2024-01-11 PROCEDURE — 97140 MANUAL THERAPY 1/> REGIONS: CPT

## 2024-01-11 PROCEDURE — 97110 THERAPEUTIC EXERCISES: CPT

## 2024-01-11 NOTE — PROGRESS NOTES
Daily Note     Today's date: 2024  Patient name: Mauricio Galvan  : 1980  MRN: [id number]  Referring provider: Joe Collins DO  Dx:   Encounter Diagnosis     ICD-10-CM    1. Acute pain of left shoulder  M25.512       2. Nerve root irritation  G54.9       3. Trapezius muscle spasm  M62.838           Start Time: 1500  Stop Time: 1548  Total time in clinic (min): 48 minutes    Subjective: Pt reports his pain is still there.       Objective: See treatment diary below      Assessment: Tolerated treatment well. He reports no complaints of pain with recent progressions. Good effort noted with all exercises. Min cueing needed for proper form and reps. Patient demonstrated fatigue post treatment, exhibited good technique with therapeutic exercises, and would benefit from continued PT      Plan: Continue per plan of care.      Precautions: N/A    POC expires Unit limit Auth Expiration date PT/OT + Visit Limit?    N/A  BOMN                 Visit/Unit Tracking   FOTO                                   FOTO, done on         Manuals    GH distraction KT   LQ STEPHANI KB DELL DELL KT STEPHANI   L post/inf shoulder mobs MM gr4   STEPHANI STEPHANI KB gr 4 TB gr 4  STEPHANI gr 4 STEPHANI STEPHANI   L scapula PROM    LQ                      Neuro Re-Ed             Pt education     4'        Scapular retraction    2x10 2x10 2x10 2x10  2x10 2x10   Cervical retraction 2x10   2x10 2x10 2x10 2x10  +trial ext 8x  Increased sx  2x10 2x10 2x10   Median nerve glides gentle 2x10 ea   2x10 ea 2x10 ea 2x10 ea 2x10  2x10 ea 2x10 ea 2x10 ea   Sidelying abduction 3x10 3#   2x10 2# 2x10 3# 2x10 3# 2x10 3# 2x10 3#  2x10 3# 3x10 3#   Serratus punch       20x 3#  20x  3#  20x 3#    Tband punch up 2x10 grn         2x10 grn                Ther Ex             Pullies flex/abd 2' ea   2' ea 2' ea 2' ea 2' ea 2' abd 2'ea 2' ea   Wall slides flex/scap    x10ea X20 ea 20  ea 20x ea  20x scap    UBE 5'   2'/2' 2'/2' 2'/2' 2'/2' 2'/2'  2'/2' 5'   Seated rhomboid stretch             Sidelying scapular clocks     2x10 2x10 2x10  2x10    Tband ER 2x10 bliue         2x10 blue   Sidelying ER 2x10 4#   2x10 2# 2x10 3# 2x10 3# 2x10  4#  1x10 3#  1x10 4#  2x10 4# 2x10 4#   [place] Rows 2x10 18#       2x10 GTB 2x10 blue 2x10 18#   Jacey Ext 2x10 18#       2x10  GTB 2x10 blue 2x10 15#   Ther Activity                                       Gait Training                                       Modalities

## 2024-01-16 ENCOUNTER — APPOINTMENT (OUTPATIENT)
Dept: PHYSICAL THERAPY | Facility: CLINIC | Age: 44
End: 2024-01-16
Payer: OTHER MISCELLANEOUS

## 2024-01-17 ENCOUNTER — OFFICE VISIT (OUTPATIENT)
Dept: FAMILY MEDICINE CLINIC | Facility: CLINIC | Age: 44
End: 2024-01-17
Payer: COMMERCIAL

## 2024-01-17 VITALS
BODY MASS INDEX: 28.04 KG/M2 | OXYGEN SATURATION: 99 % | WEIGHT: 207 LBS | SYSTOLIC BLOOD PRESSURE: 100 MMHG | DIASTOLIC BLOOD PRESSURE: 76 MMHG | HEIGHT: 72 IN | RESPIRATION RATE: 14 BRPM | TEMPERATURE: 98.1 F | HEART RATE: 72 BPM

## 2024-01-17 DIAGNOSIS — Z76.89 ENCOUNTER TO ESTABLISH CARE: Primary | ICD-10-CM

## 2024-01-17 DIAGNOSIS — Z11.4 ENCOUNTER FOR SCREENING FOR HIV: ICD-10-CM

## 2024-01-17 DIAGNOSIS — E55.9 HYPOVITAMINOSIS D: ICD-10-CM

## 2024-01-17 DIAGNOSIS — Z12.5 PROSTATE CANCER SCREENING: ICD-10-CM

## 2024-01-17 DIAGNOSIS — Z11.59 NEED FOR HEPATITIS C SCREENING TEST: ICD-10-CM

## 2024-01-17 DIAGNOSIS — E11.9 TYPE 2 DIABETES MELLITUS WITHOUT COMPLICATION, WITHOUT LONG-TERM CURRENT USE OF INSULIN (HCC): ICD-10-CM

## 2024-01-17 PROCEDURE — 99204 OFFICE O/P NEW MOD 45 MIN: CPT | Performed by: NURSE PRACTITIONER

## 2024-01-17 RX ORDER — BLOOD SUGAR DIAGNOSTIC
STRIP MISCELLANEOUS
Qty: 200 EACH | Refills: 3 | Status: SHIPPED | OUTPATIENT
Start: 2024-01-17 | End: 2024-01-17 | Stop reason: SDUPTHER

## 2024-01-17 RX ORDER — BLOOD-GLUCOSE METER
KIT MISCELLANEOUS
Qty: 1 KIT | Refills: 0 | Status: SHIPPED | OUTPATIENT
Start: 2024-01-17

## 2024-01-17 RX ORDER — BLOOD SUGAR DIAGNOSTIC
STRIP MISCELLANEOUS
Qty: 200 EACH | Refills: 3 | Status: SHIPPED | OUTPATIENT
Start: 2024-01-17

## 2024-01-17 RX ORDER — BLOOD-GLUCOSE METER
KIT MISCELLANEOUS
Qty: 1 KIT | Refills: 0 | Status: SHIPPED | OUTPATIENT
Start: 2024-01-17 | End: 2024-01-17 | Stop reason: SDUPTHER

## 2024-01-17 RX ORDER — MULTIVITAMIN/IRON/FOLIC ACID 18MG-0.4MG
1 TABLET ORAL DAILY
COMMUNITY

## 2024-01-17 RX ORDER — LANCETS 33 GAUGE
EACH MISCELLANEOUS
Qty: 200 EACH | Refills: 3 | Status: SHIPPED | OUTPATIENT
Start: 2024-01-17 | End: 2024-01-17 | Stop reason: SDUPTHER

## 2024-01-17 RX ORDER — LANCETS 33 GAUGE
EACH MISCELLANEOUS
Qty: 200 EACH | Refills: 3 | Status: SHIPPED | OUTPATIENT
Start: 2024-01-17

## 2024-01-17 NOTE — PATIENT INSTRUCTIONS
Continue metformin twice a day  Continue low carbohydrate diet  Take Rybelsus 3 mg daily for a month then increase dose to 7 mg prescription was sent to the pharmacy  Get blood work done prior to next office appointment it has to be fasting nothing to eat after 8pm at night

## 2024-01-17 NOTE — ASSESSMENT & PLAN NOTE
Patient is here to establish care.  Intake completed.  Patient reports that he was recently diagnosed with diabetes.  Has started taking metformin.  Denies any side effects of the medication.  Blood work ordered.  Diabetic testing equipment ordered.

## 2024-01-17 NOTE — PROGRESS NOTES
Name: Mauricio Galvan      : 1980      MRN: [id number]  Encounter Provider: ODILIA Barillas  Encounter Date: 2024   Encounter department: [facility] PRIMARY CARE    Assessment & Plan     1. Encounter to establish care  Assessment & Plan:  Patient is here to establish care.  Intake completed.  Patient reports that he was recently diagnosed with diabetes.  Has started taking metformin.  Denies any side effects of the medication.  Blood work ordered.  Diabetic testing equipment ordered.      2. Prostate cancer screening  -     PSA, Total Screen; Future    3. Type 2 diabetes mellitus without complication, without long-term current use of insulin (HCC)  Assessment & Plan:    Lab Results   Component Value Date    HGBA1C 12.0 (H) 2024   Patient recently diagnosed with type 2 diabetes.  Very elevated hemoglobin A1c.  Has been taking metformin twice a day.  Also reports that he has modified his diet.  Declined injectable at this time.  Will start Rybelsus.  3 mg given in office.  Prescription sent for 7 mg.  Discussed side effects of medication.    Orders:  -     semaglutide (Rybelsus) 7 MG tablet; Take 1 tablet (7 mg total) by mouth daily Take on an empty stomach with 4 oz water, and wait 30 minutes before eating  -     Comprehensive metabolic panel; Future; Expected date: 2024  -     Hemoglobin A1C; Future; Expected date: 2024  -     CBC and Platelet; Future; Expected date: 2024  -     Albumin / creatinine urine ratio; Future; Expected date: 2024  -     Lipid Panel with Direct LDL reflex; Future; Expected date: 2024  -     TSH, 3rd generation with Free T4 reflex; Future; Expected date: 2024  -     OneTouch Delica Lancets 33G MISC; Check blood sugars twice daily. Please substitute with appropriate alternative as covered by patient's insurance. Dx: E11.65  -     glucose blood (OneTouch Verio) test strip; Check blood sugars twice daily. Please substitute  with appropriate alternative as covered by patient's insurance. Dx: E11.65  -     Continuous Blood Gluc Sensor (FreeStyle Dov 2 Sensor) MISC; Check blood sugars multiple times per day  -     Continuous Blood Gluc  (FreeStyle Dov 2 [place]) MASOOD; Check blood sugars multiple times per day  -     Blood Glucose Monitoring Suppl (OneTouch Verio Reflect) w/Device KIT; Check blood sugars twice daily. Please substitute with appropriate alternative as covered by patient's insurance. Dx: E11.65    4. Hypovitaminosis D  -     Vitamin D 25 hydroxy; Future    5. Need for hepatitis C screening test  -     Hepatitis C antibody; Future    6. Encounter for screening for HIV  -     HIV 1/2 AG/AB w Reflex [facility] for 2 yr old and above; Future      Depression Screening and Follow-up Plan: Patient was screened for depression during today's encounter. They screened negative with a PHQ-2 score of 0.        Subjective      Patient is here to establish care.  Last primary care provider-unknown  Past medical history- none  Past surgical history-none  Social history-  - .  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    HENT: Negative.     Eyes: Negative.    Respiratory: Negative.     Cardiovascular: Negative.    Gastrointestinal: Negative.    Endocrine: Negative.    Genitourinary: Negative.    Musculoskeletal: Negative.    Skin: Negative.    Allergic/Immunologic: Negative.    Neurological: Negative.    Hematological: Negative.    Psychiatric/Behavioral: Negative.         Current Outpatient Medications on File Prior to Visit   Medication Sig   • metFORMIN (GLUCOPHAGE) 500  mg tablet Take 500 mg by mouth 2 (two) times a day with meals   • multivitamin-minerals (CENTRUM ADULTS) tablet Take 1 tablet by mouth daily   • methylPREDNISolone 4 MG tablet therapy pack Use as directed on package (Patient not taking: Reported on 1/17/2024)   • [DISCONTINUED] methocarbamol (ROBAXIN) 500 mg tablet Take 1 tablet (500 mg total) by mouth 2 (two) times a day (Patient not taking: Reported on 1/17/2024)       Objective     /76   Pulse 72   Temp 98.1 °F (36.7 °C) (Temporal)   Resp 14   Ht 6' (1.829 m)   Wt 93.9 kg (207 lb)   SpO2 99%   BMI 28.07 kg/m²     Physical Exam  Vitals and nursing note reviewed.   Constitutional:       Appearance: Normal appearance. He is well-developed.   HENT:      Head: Normocephalic and atraumatic.   Cardiovascular:      Rate and Rhythm: Normal rate and regular rhythm.      Pulses: Normal pulses.      Heart sounds: Normal heart sounds.   Pulmonary:      Effort: Pulmonary effort is normal.      Breath sounds: Normal breath sounds.   Musculoskeletal:         General: Normal range of motion.   Skin:     General: Skin is warm and dry.   Neurological:      General: No focal deficit present.      Mental Status: He is alert and oriented to person, place, and time.   Psychiatric:         Mood and Affect: Mood normal.         Behavior: Behavior normal.         Thought Content: Thought content normal.         Judgment: Judgment normal.       ODILIA Barillas

## 2024-01-17 NOTE — ASSESSMENT & PLAN NOTE
Lab Results   Component Value Date    HGBA1C 12.0 (H) 01/01/2024   Patient recently diagnosed with type 2 diabetes.  Very elevated hemoglobin A1c.  Has been taking metformin twice a day.  Also reports that he has modified his diet.  Declined injectable at this time.  Will start Rybelsus.  3 mg given in office.  Prescription sent for 7 mg.  Discussed side effects of medication.

## 2024-01-18 ENCOUNTER — APPOINTMENT (OUTPATIENT)
Dept: PHYSICAL THERAPY | Facility: CLINIC | Age: 44
End: 2024-01-18
Payer: OTHER MISCELLANEOUS

## 2024-01-22 ENCOUNTER — OFFICE VISIT (OUTPATIENT)
Dept: OBGYN CLINIC | Facility: CLINIC | Age: 44
End: 2024-01-22
Payer: OTHER MISCELLANEOUS

## 2024-01-22 VITALS
HEART RATE: 79 BPM | HEIGHT: 72 IN | BODY MASS INDEX: 28.04 KG/M2 | SYSTOLIC BLOOD PRESSURE: 124 MMHG | DIASTOLIC BLOOD PRESSURE: 83 MMHG | WEIGHT: 207 LBS

## 2024-01-22 DIAGNOSIS — M62.838 TRAPEZIUS MUSCLE SPASM: Primary | ICD-10-CM

## 2024-01-22 PROCEDURE — 99213 OFFICE O/P EST LOW 20 MIN: CPT | Performed by: ORTHOPAEDIC SURGERY

## 2024-01-22 NOTE — PROGRESS NOTES
Patient Name:  Mauricio Galvan  MRN:  [id number]    Assessment & Plan     1. Trapezius muscle spasm        43 y.o. male with Left shoulder Upper trapezius muscle spasm and suspected cervical nerve root irritation s/p work related injury 10/30/23 which has been improving.    The patient is doing well.  He notes appx 90% improvement with formal therapy and HEP.  He has improved motion and strength on exam today.  He can return to work full duty without restrictions and a note was provided for this today.  He was advised to continue with HEP.  He may follow up with me as nean.     History of the Present Illness   Mauricio Galvan is a 43 y.o. male with Left shoulder pain after work related injury on 10/30/23. The patient states he is doing well. He completed formal therapy and has been compliant with HEP. He notes appx 90% improvement overall. He notes intermittent mild pain to the lateral and posterior aspect of the shoulder with certain movements and if his arm is stretched out for a long period of time. He has been taking Aleve as needed. He has been working light duty.        Review of Systems     Review of Systems   Constitutional:  Negative for chills and fever.   HENT:  Negative for drooling and sneezing.    Eyes:  Negative for redness.   Respiratory:  Negative for cough and wheezing.    Gastrointestinal:  Negative for nausea and vomiting.   Musculoskeletal:  Positive for arthralgias. Negative for joint swelling and myalgias.   Neurological:  Negative for weakness and numbness.   Psychiatric/Behavioral:  Negative for behavioral problems. The patient is not nervous/anxious.        Physical Exam     /83   Pulse 79   Ht 6' (1.829 m)   Wt 93.9 kg (207 lb)   BMI 28.07 kg/m²     Left  Shoulder:   Active range of motion   170 degrees forward flexion  140 degrees abduction  60 degrees external rotation   1 level restriction internal rotation    Passive range of motion       There is minimal tenderness present  over the trapezium.   Supraspinatus testing 5/5  Infraspinatus testing 5/5  Subscapularis testing 5.5  Morales test is negative   Kearney's test is negative    The patient is neurovascularly intact distally in the extremity.      Data Review     I have personally reviewed pertinent films in PACS, and my interpretation follows.    No new images reviewed.    Social History     Tobacco Use    Smoking status: Never     Passive exposure: Current    Smokeless tobacco: Never   Vaping Use    Vaping status: Never Used           Procedures  None performed     Scribe Attestation      I,:  Leidy Carpio MA am acting as a scribe while in the presence of the attending physician.:       I,:  Joe Collins DO personally performed the services described in this documentation    as scribed in my presence.:

## 2024-01-22 NOTE — LETTER
January 22, 2024     Patient: Mauricio Galvan  YOB: 1980  Date of Visit: 1/22/2024      To Whom it May Concern:    Mauricio Galvan is under my professional care. Mauricio was seen in my office on 1/22/2024. Mauricio may return to work full duty with no restrictions.     If you have any questions or concerns, please don't hesitate to call.         Sincerely,          Joe Collins, DO

## 2024-01-23 ENCOUNTER — APPOINTMENT (OUTPATIENT)
Dept: PHYSICAL THERAPY | Facility: CLINIC | Age: 44
End: 2024-01-23
Payer: OTHER MISCELLANEOUS

## 2024-01-25 ENCOUNTER — APPOINTMENT (OUTPATIENT)
Dept: PHYSICAL THERAPY | Facility: CLINIC | Age: 44
End: 2024-01-25
Payer: OTHER MISCELLANEOUS

## 2024-01-30 ENCOUNTER — APPOINTMENT (OUTPATIENT)
Dept: PHYSICAL THERAPY | Facility: CLINIC | Age: 44
End: 2024-01-30
Payer: OTHER MISCELLANEOUS

## 2024-02-12 ENCOUNTER — TELEPHONE (OUTPATIENT)
Dept: FAMILY MEDICINE CLINIC | Facility: CLINIC | Age: 44
End: 2024-02-12

## 2024-04-17 ENCOUNTER — TELEPHONE (OUTPATIENT)
Dept: FAMILY MEDICINE CLINIC | Facility: CLINIC | Age: 44
End: 2024-04-17

## 2024-04-17 NOTE — TELEPHONE ENCOUNTER
My name is Nuno Gomez 1980. I'm trying to find out exactly what day is my appointment so you can call back at [phone]. It would be greatly appreciated. Thank you.      Patient was called back and informed

## 2024-04-22 ENCOUNTER — APPOINTMENT (OUTPATIENT)
Dept: LAB | Facility: HOSPITAL | Age: 44
End: 2024-04-22
Payer: COMMERCIAL

## 2024-04-22 DIAGNOSIS — E55.9 HYPOVITAMINOSIS D: ICD-10-CM

## 2024-04-22 DIAGNOSIS — E11.9 TYPE 2 DIABETES MELLITUS WITHOUT COMPLICATION, WITHOUT LONG-TERM CURRENT USE OF INSULIN (HCC): ICD-10-CM

## 2024-04-22 DIAGNOSIS — Z11.4 ENCOUNTER FOR SCREENING FOR HIV: ICD-10-CM

## 2024-04-22 DIAGNOSIS — Z11.59 NEED FOR HEPATITIS C SCREENING TEST: ICD-10-CM

## 2024-04-22 DIAGNOSIS — Z12.5 PROSTATE CANCER SCREENING: ICD-10-CM

## 2024-04-22 LAB
25(OH)D3 SERPL-MCNC: 76.4 NG/ML (ref 30–100)
ALBUMIN SERPL BCP-MCNC: 4.7 G/DL (ref 3.5–5)
ALP SERPL-CCNC: 63 U/L (ref 34–104)
ALT SERPL W P-5'-P-CCNC: 11 U/L (ref 7–52)
ANION GAP SERPL CALCULATED.3IONS-SCNC: 5 MMOL/L (ref 4–13)
AST SERPL W P-5'-P-CCNC: 16 U/L (ref 13–39)
BILIRUB SERPL-MCNC: 0.38 MG/DL (ref 0.2–1)
BUN SERPL-MCNC: 14 MG/DL (ref 5–25)
CALCIUM SERPL-MCNC: 9.9 MG/DL (ref 8.4–10.2)
CHLORIDE SERPL-SCNC: 103 MMOL/L (ref 96–108)
CHOLEST SERPL-MCNC: 181 MG/DL
CO2 SERPL-SCNC: 31 MMOL/L (ref 21–32)
CREAT SERPL-MCNC: 1.06 MG/DL (ref 0.6–1.3)
ERYTHROCYTE [DISTWIDTH] IN BLOOD BY AUTOMATED COUNT: 14.1 % (ref 11.6–15.1)
EST. AVERAGE GLUCOSE BLD GHB EST-MCNC: 157 MG/DL
GFR SERPL CREATININE-BSD FRML MDRD: 86 ML/MIN/1.73SQ M
GLUCOSE P FAST SERPL-MCNC: 126 MG/DL (ref 65–99)
HBA1C MFR BLD: 7.1 %
HCT VFR BLD AUTO: 45.3 % (ref 36.5–49.3)
HDLC SERPL-MCNC: 48 MG/DL
HGB BLD-MCNC: 15.4 G/DL (ref 12–17)
LDLC SERPL CALC-MCNC: 119 MG/DL (ref 0–100)
MCH RBC QN AUTO: 31.2 PG (ref 26.8–34.3)
MCHC RBC AUTO-ENTMCNC: 34 G/DL (ref 31.4–37.4)
MCV RBC AUTO: 92 FL (ref 82–98)
PLATELET # BLD AUTO: 262 THOUSANDS/UL (ref 149–390)
PMV BLD AUTO: 9.6 FL (ref 8.9–12.7)
POTASSIUM SERPL-SCNC: 4.3 MMOL/L (ref 3.5–5.3)
PROT SERPL-MCNC: 7.5 G/DL (ref 6.4–8.4)
PSA SERPL-MCNC: 0.53 NG/ML (ref 0–4)
RBC # BLD AUTO: 4.94 MILLION/UL (ref 3.88–5.62)
SODIUM SERPL-SCNC: 139 MMOL/L (ref 135–147)
TRIGL SERPL-MCNC: 71 MG/DL
TSH SERPL DL<=0.05 MIU/L-ACNC: 1.72 UIU/ML (ref 0.45–4.5)
WBC # BLD AUTO: 6.96 THOUSAND/UL (ref 4.31–10.16)

## 2024-04-22 PROCEDURE — 85027 COMPLETE CBC AUTOMATED: CPT

## 2024-04-22 PROCEDURE — 82306 VITAMIN D 25 HYDROXY: CPT

## 2024-04-22 PROCEDURE — 80053 COMPREHEN METABOLIC PANEL: CPT

## 2024-04-22 PROCEDURE — G0103 PSA SCREENING: HCPCS

## 2024-04-22 PROCEDURE — 87389 HIV-1 AG W/HIV-1&-2 AB AG IA: CPT

## 2024-04-22 PROCEDURE — 83036 HEMOGLOBIN GLYCOSYLATED A1C: CPT

## 2024-04-22 PROCEDURE — 36415 COLL VENOUS BLD VENIPUNCTURE: CPT

## 2024-04-22 PROCEDURE — 84443 ASSAY THYROID STIM HORMONE: CPT

## 2024-04-22 PROCEDURE — 82043 UR ALBUMIN QUANTITATIVE: CPT

## 2024-04-22 PROCEDURE — 86803 HEPATITIS C AB TEST: CPT

## 2024-04-22 PROCEDURE — 82570 ASSAY OF URINE CREATININE: CPT

## 2024-04-22 PROCEDURE — 80061 LIPID PANEL: CPT

## 2024-04-23 LAB
CREAT UR-MCNC: 363 MG/DL
HCV AB SER QL: NORMAL
HIV 1+2 AB+HIV1 P24 AG SERPL QL IA: NORMAL
HIV 2 AB SERPL QL IA: NORMAL
HIV1 AB SERPL QL IA: NORMAL
HIV1 P24 AG SERPL QL IA: NORMAL
MICROALBUMIN UR-MCNC: 15.6 MG/L
MICROALBUMIN/CREAT 24H UR: 4 MG/G CREATININE (ref 0–30)

## 2024-04-27 LAB
LEFT EYE DIABETIC RETINOPATHY: NORMAL
RIGHT EYE DIABETIC RETINOPATHY: NORMAL
SEVERITY (EYE EXAM): NORMAL

## 2024-05-07 ENCOUNTER — OFFICE VISIT (OUTPATIENT)
Dept: FAMILY MEDICINE CLINIC | Facility: CLINIC | Age: 44
End: 2024-05-07
Payer: COMMERCIAL

## 2024-05-07 ENCOUNTER — TELEPHONE (OUTPATIENT)
Dept: FAMILY MEDICINE CLINIC | Facility: CLINIC | Age: 44
End: 2024-05-07

## 2024-05-07 VITALS
TEMPERATURE: 98.1 F | RESPIRATION RATE: 14 BRPM | WEIGHT: 202 LBS | OXYGEN SATURATION: 97 % | SYSTOLIC BLOOD PRESSURE: 102 MMHG | BODY MASS INDEX: 27.36 KG/M2 | HEART RATE: 82 BPM | HEIGHT: 72 IN | DIASTOLIC BLOOD PRESSURE: 70 MMHG

## 2024-05-07 DIAGNOSIS — M25.512 ACUTE PAIN OF LEFT SHOULDER: ICD-10-CM

## 2024-05-07 DIAGNOSIS — Z00.00 ANNUAL PHYSICAL EXAM: Primary | ICD-10-CM

## 2024-05-07 DIAGNOSIS — E11.9 TYPE 2 DIABETES MELLITUS WITHOUT COMPLICATION, WITHOUT LONG-TERM CURRENT USE OF INSULIN (HCC): ICD-10-CM

## 2024-05-07 PROCEDURE — 99396 PREV VISIT EST AGE 40-64: CPT | Performed by: NURSE PRACTITIONER

## 2024-05-07 RX ORDER — METHOCARBAMOL 750 MG/1
750 TABLET, FILM COATED ORAL
Qty: 30 TABLET | Refills: 0 | Status: SHIPPED | OUTPATIENT
Start: 2024-05-07 | End: 2024-05-08 | Stop reason: SDUPTHER

## 2024-05-07 NOTE — ASSESSMENT & PLAN NOTE
Lab Results   Component Value Date    HGBA1C 7.1 (H) 04/22/2024   Hemoglobin A1c has significantly improved.  Patient reports that he has changed his diet.  Did not take the Rybelsus due to the cost of the medication.  Has been taking metformin and some supplements.  Will discontinue Rybelsus continue metformin continue low carbohydrate diet.  Eye exam was done last week.  Blood work ordered

## 2024-05-07 NOTE — ASSESSMENT & PLAN NOTE
Annual physical completed.  Patient is doing well.  Blood sugars has been under control.  Reviewed blood work.  Discussed self-care.  Maintain heart healthy low-carbohydrate diet.

## 2024-05-07 NOTE — TELEPHONE ENCOUNTER
Patient has new start medication Methocarbbamol 750 mg sent to [facility] mail order need to be 90 day supply to be sent to [facility] - [place], [place] [address] [phone]

## 2024-05-07 NOTE — PROGRESS NOTES
ADULT ANNUAL PHYSICAL  [facility] PRIMARY CARE    NAME: Mauricio Galvan  AGE: 43 y.o. SEX: male  : 1980     DATE: 2024     Assessment and Plan:     Problem List Items Addressed This Visit        Endocrine    Type 2 diabetes mellitus without complication, without long-term current use of insulin (HCC)       Lab Results   Component Value Date    HGBA1C 7.1 (H) 2024   Hemoglobin A1c has significantly improved.  Patient reports that he has changed his diet.  Did not take the Rybelsus due to the cost of the medication.  Has been taking metformin and some supplements.  Will discontinue Rybelsus continue metformin continue low carbohydrate diet.  Eye exam was done last week.  Blood work ordered         Relevant Medications    metFORMIN (GLUCOPHAGE) 500 mg tablet    Continuous Glucose  (FreeStyle Dov 2 [place]) MASOOD    Continuous Glucose Sensor (FreeStyle Dov 2 Sensor) MISC    Other Relevant Orders    Hemoglobin A1C    Comprehensive metabolic panel    Albumin / creatinine urine ratio    Lipid Panel with Direct LDL reflex    TSH, 3rd generation with Free T4 reflex       Surgery/Wound/Pain    Acute pain of left shoulder     Continues to have pain of left shoulder.  Discussed heat, stretches.  Robaxin ordered.         Relevant Medications    methocarbamol (Robaxin-750) 750 mg tablet       Other    Annual physical exam - Primary     Annual physical completed.  Patient is doing well.  Blood sugars has been under control.  Reviewed blood work.  Discussed self-care.  Maintain heart healthy low-carbohydrate diet.              Immunizations and preventive care screenings were discussed with patient today. Appropriate education was printed on patient's after visit summary.    Discussed risks and benefits of prostate cancer screening. We discussed the controversial history of PSA screening for prostate cancer in the [place] as well as the risk of  over detection and over treatment of prostate cancer by way of PSA screening.  The patient understands that PSA blood testing is an imperfect way to screen for prostate cancer and that elevated PSA levels in the blood may also be caused by infection, inflammation, prostatic trauma or manipulation, urological procedures, or by benign prostatic enlargement.    The role of the digital rectal examination in prostate cancer screening was also discussed and I discussed with him that there is large interobserver variability in the findings of digital rectal examination.    Counseling:  Alcohol/drug use: discussed moderation in alcohol intake, the recommendations for healthy alcohol use, and avoidance of illicit drug use.  Dental Health: discussed importance of regular tooth brushing, flossing, and dental visits.  Injury prevention: discussed safety/seat belts, safety helmets, smoke detectors, carbon dioxide detectors, and smoking near bedding or upholstery.  Sexual health: discussed sexually transmitted diseases, partner selection, use of condoms, avoidance of unintended pregnancy, and contraceptive alternatives.  Exercise: the importance of regular exercise/physical activity was discussed. Recommend exercise 3-5 times per week for at least 30 minutes.          Return in about 6 months (around 11/7/2024) for Recheck diabetes.     Chief Complaint:     Chief Complaint   Patient presents with   • Physical Exam     Need DM Foot and DM Eye Exam      History of Present Illness:     Adult Annual Physical   Patient here for a comprehensive physical exam. The patient reports problems - diabetes .    Diet and Physical Activity  Diet/Nutrition: low carb diet.   Exercise: no formal exercise.      Depression Screening  PHQ-2/9 Depression Screening         General Health  Sleep: sleeps well.   Hearing: normal - bilateral.  Vision: goes for regular eye exams and most recent eye exam <1 year ago.   Dental: no dental visits for >1 year and  brushes teeth twice daily.        Health  Symptoms include: none    Advanced Care Planning  Do you have an advanced directive? no  Do you have a durable medical power of ? no  ACP document given to patient? no     Review of Systems:     Review of Systems   Past Medical History:     Past Medical History:   Diagnosis Date   • Depression       Past Surgical History:     History reviewed. No pertinent surgical history.   Family History:     History reviewed. No pertinent family history.   Social History:     Social History     Socioeconomic History   • Marital status: Single     Spouse name: None   • Number of children: None   • Years of education: None   • Highest education level: None   Occupational History   • None   Tobacco Use   • Smoking status: Never     Passive exposure: Current   • Smokeless tobacco: Never   Vaping Use   • Vaping status: Never Used   Substance and Sexual Activity   • Alcohol use: None   • Drug use: None   • Sexual activity: None   Other Topics Concern   • None   Social History Narrative   • None     Social Determinants of Health     Financial Resource Strain: Not on file   Food Insecurity: Not on file   Transportation Needs: Not on file   Physical Activity: Not on file   Stress: Not on file   Social Connections: Not on file   Intimate Partner Violence: Not on file   Housing Stability: Not on file      Current Medications:     Current Outpatient Medications   Medication Sig Dispense Refill   • Blood Glucose Monitoring Suppl (OneTouch Verio Reflect) w/Device KIT Check blood sugars twice daily. Please substitute with appropriate alternative as covered by patient's insurance. Dx: E11.65 1 kit 0   • Continuous Glucose  (FreeStyle Dov 2 [place]) MASOOD Check blood sugars multiple times per day 1 each 0   • Continuous Glucose Sensor (FreeStyle Dov 2 Sensor) MISC Check blood sugars multiple times per day 6 each 3   • glucose blood (OneTouch Verio) test strip Check blood sugars twice  daily. Please substitute with appropriate alternative as covered by patient's insurance. Dx: E11.65 200 each 3   • metFORMIN (GLUCOPHAGE) 500 mg tablet Take 2 tablets (1,000 mg total) by mouth 2 (two) times a day with meals 360 tablet 1   • methocarbamol (Robaxin-750) 750 mg tablet Take 1 tablet (750 mg total) by mouth daily at bedtime as needed for muscle spasms 30 tablet 0   • multivitamin-minerals (CENTRUM ADULTS) tablet Take 1 tablet by mouth daily     • OneTouch Delica Lancets 33G MISC Check blood sugars twice daily. Please substitute with appropriate alternative as covered by patient's insurance. Dx: E11.65 200 each 3   • methylPREDNISolone 4 MG tablet therapy pack Use as directed on package (Patient not taking: Reported on 1/17/2024) 21 tablet 0     No current facility-administered medications for this visit.      Allergies:     No Known Allergies   Physical Exam:     /70   Pulse 82   Temp 98.1 °F (36.7 °C) (Temporal)   Resp 14   Ht 6' (1.829 m)   Wt 91.6 kg (202 lb)   SpO2 97%   BMI 27.40 kg/m²     Physical Exam  Cardiovascular:      Pulses: no weak pulses.           Dorsalis pedis pulses are 2+ on the right side and 2+ on the left side.        Posterior tibial pulses are 2+ on the right side and 2+ on the left side.   Feet:      Right foot:      Skin integrity: No ulcer, skin breakdown, erythema, warmth, callus or dry skin.      Left foot:      Skin integrity: No ulcer, skin breakdown, erythema, warmth, callus or dry skin.        Diabetic Foot Exam    Patient's shoes and socks removed.    Right Foot/Ankle   Right Foot Inspection  Skin Exam: skin normal and skin intact. No dry skin, no warmth, no callus, no erythema, no maceration, no abnormal color, no pre-ulcer, no ulcer and no callus.     Toe Exam: ROM and strength within normal limits.     Sensory   Vibration: intact  Proprioception: intact  Monofilament testing: intact    Vascular  Capillary refills: < 3 seconds  The right DP pulse is 2+.  The right PT pulse is 2+.     Right Toe  - Comprehensive Exam  Ecchymosis: none  Arch: normal  Hammertoes: absent  Claw Toes: absent  Swelling: none   Tenderness: none       Left Foot/Ankle  Left Foot Inspection  Skin Exam: skin normal and skin intact. No dry skin, no warmth, no erythema, no maceration, normal color, no pre-ulcer, no ulcer and no callus.     Toe Exam: ROM and strength within normal limits.     Sensory   Vibration: intact  Proprioception: intact  Monofilament testing: intact    Vascular  Capillary refills: < 3 seconds  The left DP pulse is 2+. The left PT pulse is 2+.     Left Toe  - Comprehensive Exam  Ecchymosis: none  Arch: normal  Hammertoes: absent  Claw toes: absent  Swelling: none   Tenderness: none       Assign Risk Category  No deformity present  No loss of protective sensation  No weak pulses  Risk: 0    ODILIA Barillas  [facility] PRIMARY CARE

## 2024-05-08 DIAGNOSIS — M25.512 ACUTE PAIN OF LEFT SHOULDER: ICD-10-CM

## 2024-05-08 DIAGNOSIS — E11.9 TYPE 2 DIABETES MELLITUS WITHOUT COMPLICATION, WITHOUT LONG-TERM CURRENT USE OF INSULIN (HCC): Primary | ICD-10-CM

## 2024-05-08 RX ORDER — METHOCARBAMOL 750 MG/1
750 TABLET, FILM COATED ORAL
Qty: 90 TABLET | Refills: 0 | Status: SHIPPED | OUTPATIENT
Start: 2024-05-08

## 2024-05-22 ENCOUNTER — DOCUMENTATION (OUTPATIENT)
Dept: FAMILY MEDICINE CLINIC | Facility: CLINIC | Age: 44
End: 2024-05-22

## 2024-05-28 ENCOUNTER — VBI (OUTPATIENT)
Dept: ADMINISTRATIVE | Facility: OTHER | Age: 44
End: 2024-05-28

## 2024-05-29 NOTE — TELEPHONE ENCOUNTER
Upon review of the In Basket request we were able to locate, review, and update the patient chart as requested for Diabetic Eye Exam.    Any additional questions or concerns should be emailed to the Practice Liaisons via the appropriate education email address, please do not reply via In Basket.    Thank you  Billie Biggs   [facility]

## 2024-07-09 ENCOUNTER — TELEPHONE (OUTPATIENT)
Dept: FAMILY MEDICINE CLINIC | Facility: CLINIC | Age: 44
End: 2024-07-09

## 2024-11-13 ENCOUNTER — OFFICE VISIT (OUTPATIENT)
Dept: FAMILY MEDICINE CLINIC | Facility: CLINIC | Age: 44
End: 2024-11-13
Payer: COMMERCIAL

## 2024-11-13 VITALS
OXYGEN SATURATION: 98 % | DIASTOLIC BLOOD PRESSURE: 70 MMHG | BODY MASS INDEX: 28.17 KG/M2 | HEART RATE: 89 BPM | RESPIRATION RATE: 16 BRPM | TEMPERATURE: 98 F | HEIGHT: 72 IN | SYSTOLIC BLOOD PRESSURE: 106 MMHG | WEIGHT: 208 LBS

## 2024-11-13 DIAGNOSIS — Z23 ENCOUNTER FOR IMMUNIZATION: ICD-10-CM

## 2024-11-13 DIAGNOSIS — F17.200 SMOKING ADDICTION: ICD-10-CM

## 2024-11-13 DIAGNOSIS — E11.9 TYPE 2 DIABETES MELLITUS WITHOUT COMPLICATION, WITHOUT LONG-TERM CURRENT USE OF INSULIN (HCC): Primary | ICD-10-CM

## 2024-11-13 LAB — SL AMB POCT HEMOGLOBIN AIC: 7.2 (ref ?–6.5)

## 2024-11-13 PROCEDURE — 90715 TDAP VACCINE 7 YRS/> IM: CPT

## 2024-11-13 PROCEDURE — 99214 OFFICE O/P EST MOD 30 MIN: CPT | Performed by: NURSE PRACTITIONER

## 2024-11-13 PROCEDURE — 83036 HEMOGLOBIN GLYCOSYLATED A1C: CPT | Performed by: NURSE PRACTITIONER

## 2024-11-13 PROCEDURE — 90471 IMMUNIZATION ADMIN: CPT

## 2024-11-13 NOTE — ASSESSMENT & PLAN NOTE
Reports smoking about 4 cigarettes a day.  Reports that he has cut back.  Client pharmacological intervention today.  Education and encouragement provided.  Discussed the benefits of smoking cessation

## 2024-11-13 NOTE — ASSESSMENT & PLAN NOTE
Patient hemoglobin A1c is stable.  Continue metformin twice a day continue low calorie diet.  Encouraged to get blood work done  Lab Results   Component Value Date    HGBA1C 7.2 (A) 11/13/2024       Orders:    POCT hemoglobin A1c    CBC and differential; Future    Comprehensive metabolic panel; Future    Lipid panel; Future    Albumin / creatinine urine ratio; Future

## 2024-11-13 NOTE — PROGRESS NOTES
Name: Mauricio Galvan      : 1980      MRN: [id number]  Encounter Provider: ODILIA Barillas  Encounter Date: 2024   Encounter department: [facility] PRIMARY CARE  :  Assessment & Plan  Type 2 diabetes mellitus without complication, without long-term current use of insulin (HCC)  Patient hemoglobin A1c is stable.  Continue metformin twice a day continue low calorie diet.  Encouraged to get blood work done  Lab Results   Component Value Date    HGBA1C 7.2 (A) 2024       Orders:    POCT hemoglobin A1c    CBC and differential; Future    Comprehensive metabolic panel; Future    Lipid panel; Future    Albumin / creatinine urine ratio; Future    Encounter for immunization  Is expecting a new baby in 2 weeks needs to get pertussis.  Vaccine administered  Orders:    TDAP VACCINE GREATER THAN OR EQUAL TO 6YO IM    Smoking addiction  Reports smoking about 4 cigarettes a day.  Reports that he has cut back.  Client pharmacological intervention today.  Education and encouragement provided.  Discussed the benefits of smoking cessation             History of Present Illness   Is here to follow-up on diabetes also needs to get pertussis, is expecting a new baby.  Reports feeling well.      Mauricio Galvan is a 43 y.o. male who presents for follow-up for diabetes.  Feels well has been taking his metformin    Review of Systems   Constitutional: Negative.    HENT: Negative.     Eyes: Negative.    Respiratory: Negative.     Cardiovascular: Negative.    Gastrointestinal: Negative.    Endocrine: Negative.    Genitourinary: Negative.    Musculoskeletal: Negative.    Skin: Negative.    Allergic/Immunologic: Negative.    Neurological: Negative.    Hematological: Negative.    Psychiatric/Behavioral: Negative.            Objective   /70 (BP Location: Left arm, Patient Position: Sitting, Cuff Size: Extra-Large)   Pulse 89   Temp 98 °F (36.7 °C) (Temporal)   Resp 16   Ht 6' (1.829 m)   Wt 94.3 kg  (208 lb)   SpO2 98%   BMI 28.21 kg/m²      Physical Exam  Vitals and nursing note reviewed.   Constitutional:       General: He is not in acute distress.     Appearance: Normal appearance. He is well-developed.   HENT:      Head: Normocephalic and atraumatic.   Cardiovascular:      Rate and Rhythm: Normal rate and regular rhythm.      Pulses: Normal pulses.      Heart sounds: Normal heart sounds. No murmur heard.  Pulmonary:      Effort: Pulmonary effort is normal. No respiratory distress.      Breath sounds: Normal breath sounds.   Abdominal:      Palpations: Abdomen is soft.      Tenderness: There is no abdominal tenderness.   Musculoskeletal:         General: No swelling.      Cervical back: Normal range of motion and neck supple.   Skin:     General: Skin is warm and dry.      Capillary Refill: Capillary refill takes less than 2 seconds.   Neurological:      General: No focal deficit present.      Mental Status: He is alert and oriented to person, place, and time.   Psychiatric:         Mood and Affect: Mood normal.         Behavior: Behavior normal.         Thought Content: Thought content normal.         Judgment: Judgment normal.         Depression Screening and Follow-up Plan: Patient was screened for depression during today's encounter. They screened negative with a PHQ-2 score of 0.

## 2025-03-25 DIAGNOSIS — E11.9 TYPE 2 DIABETES MELLITUS WITHOUT COMPLICATION, WITHOUT LONG-TERM CURRENT USE OF INSULIN (HCC): ICD-10-CM

## 2025-03-31 ENCOUNTER — NURSE TRIAGE (OUTPATIENT)
Age: 45
End: 2025-03-31

## 2025-03-31 DIAGNOSIS — E11.9 TYPE 2 DIABETES MELLITUS WITHOUT COMPLICATION, WITHOUT LONG-TERM CURRENT USE OF INSULIN (HCC): ICD-10-CM

## 2025-03-31 NOTE — TELEPHONE ENCOUNTER
"Reason for Disposition   Prescription prescribed recently is not at pharmacy and triager has access to patient's EMR and prescription is recorded in the EMR    Answer Assessment - Initial Assessment Questions  1. NAME of MEDICINE: \"What medicine(s) are you calling about?\"        Continuous Glucose Sensor (FreeStyle Dov 2 Sensor) MISC      2. QUESTION: \"What is your question?\" (e.g., double dose of medicine, side effect)      Send to [facility] pharmacy  3. PRESCRIBER: \"Who prescribed the medicine?\" Reason: if prescribed by specialist, call should be referred to that group.      Dana HARTLEY    Protocols used: Medication Question Call-Adult-[place]    "

## 2025-04-28 ENCOUNTER — APPOINTMENT (OUTPATIENT)
Dept: LAB | Facility: HOSPITAL | Age: 45
End: 2025-04-28
Payer: COMMERCIAL

## 2025-04-28 DIAGNOSIS — E11.9 TYPE 2 DIABETES MELLITUS WITHOUT COMPLICATION, WITHOUT LONG-TERM CURRENT USE OF INSULIN (HCC): ICD-10-CM

## 2025-04-28 LAB
ALBUMIN SERPL BCG-MCNC: 4.6 G/DL (ref 3.5–5)
ALP SERPL-CCNC: 67 U/L (ref 34–104)
ALT SERPL W P-5'-P-CCNC: 13 U/L (ref 7–52)
ANION GAP SERPL CALCULATED.3IONS-SCNC: 6 MMOL/L (ref 4–13)
AST SERPL W P-5'-P-CCNC: 14 U/L (ref 13–39)
BASOPHILS # BLD AUTO: 0.04 THOUSANDS/ÂΜL (ref 0–0.1)
BASOPHILS NFR BLD AUTO: 0 % (ref 0–1)
BILIRUB SERPL-MCNC: 0.34 MG/DL (ref 0.2–1)
BUN SERPL-MCNC: 11 MG/DL (ref 5–25)
CALCIUM SERPL-MCNC: 9.4 MG/DL (ref 8.4–10.2)
CHLORIDE SERPL-SCNC: 103 MMOL/L (ref 96–108)
CHOLEST SERPL-MCNC: 180 MG/DL (ref ?–200)
CO2 SERPL-SCNC: 30 MMOL/L (ref 21–32)
CREAT SERPL-MCNC: 0.95 MG/DL (ref 0.6–1.3)
CREAT UR-MCNC: 201.5 MG/DL
EOSINOPHIL # BLD AUTO: 0.23 THOUSAND/ÂΜL (ref 0–0.61)
EOSINOPHIL NFR BLD AUTO: 3 % (ref 0–6)
ERYTHROCYTE [DISTWIDTH] IN BLOOD BY AUTOMATED COUNT: 13.4 % (ref 11.6–15.1)
EST. AVERAGE GLUCOSE BLD GHB EST-MCNC: 180 MG/DL
GFR SERPL CREATININE-BSD FRML MDRD: 98 ML/MIN/1.73SQ M
GLUCOSE P FAST SERPL-MCNC: 119 MG/DL (ref 65–99)
HBA1C MFR BLD: 7.9 %
HCT VFR BLD AUTO: 44.3 % (ref 36.5–49.3)
HDLC SERPL-MCNC: 47 MG/DL
HGB BLD-MCNC: 14.8 G/DL (ref 12–17)
IMM GRANULOCYTES # BLD AUTO: 0.03 THOUSAND/UL (ref 0–0.2)
IMM GRANULOCYTES NFR BLD AUTO: 0 % (ref 0–2)
LDLC SERPL CALC-MCNC: 110 MG/DL (ref 0–100)
LYMPHOCYTES # BLD AUTO: 2.67 THOUSANDS/ÂΜL (ref 0.6–4.47)
LYMPHOCYTES NFR BLD AUTO: 29 % (ref 14–44)
MCH RBC QN AUTO: 31.1 PG (ref 26.8–34.3)
MCHC RBC AUTO-ENTMCNC: 33.4 G/DL (ref 31.4–37.4)
MCV RBC AUTO: 93 FL (ref 82–98)
MICROALBUMIN UR-MCNC: 11.1 MG/L
MICROALBUMIN/CREAT 24H UR: 6 MG/G CREATININE (ref 0–30)
MONOCYTES # BLD AUTO: 0.53 THOUSAND/ÂΜL (ref 0.17–1.22)
MONOCYTES NFR BLD AUTO: 6 % (ref 4–12)
NEUTROPHILS # BLD AUTO: 5.68 THOUSANDS/ÂΜL (ref 1.85–7.62)
NEUTS SEG NFR BLD AUTO: 62 % (ref 43–75)
NRBC BLD AUTO-RTO: 0 /100 WBCS
PLATELET # BLD AUTO: 246 THOUSANDS/UL (ref 149–390)
PMV BLD AUTO: 9.4 FL (ref 8.9–12.7)
POTASSIUM SERPL-SCNC: 4.5 MMOL/L (ref 3.5–5.3)
PROT SERPL-MCNC: 7.4 G/DL (ref 6.4–8.4)
RBC # BLD AUTO: 4.76 MILLION/UL (ref 3.88–5.62)
SODIUM SERPL-SCNC: 139 MMOL/L (ref 135–147)
TRIGL SERPL-MCNC: 114 MG/DL (ref ?–150)
TSH SERPL DL<=0.05 MIU/L-ACNC: 1.18 UIU/ML (ref 0.45–4.5)
WBC # BLD AUTO: 9.18 THOUSAND/UL (ref 4.31–10.16)

## 2025-04-28 PROCEDURE — 80061 LIPID PANEL: CPT

## 2025-04-28 PROCEDURE — 83036 HEMOGLOBIN GLYCOSYLATED A1C: CPT

## 2025-04-28 PROCEDURE — 36415 COLL VENOUS BLD VENIPUNCTURE: CPT

## 2025-04-28 PROCEDURE — 80053 COMPREHEN METABOLIC PANEL: CPT

## 2025-04-28 PROCEDURE — 82043 UR ALBUMIN QUANTITATIVE: CPT

## 2025-04-28 PROCEDURE — 82570 ASSAY OF URINE CREATININE: CPT

## 2025-04-28 PROCEDURE — 85025 COMPLETE CBC W/AUTO DIFF WBC: CPT

## 2025-04-28 PROCEDURE — 84443 ASSAY THYROID STIM HORMONE: CPT

## 2025-04-30 ENCOUNTER — RESULTS FOLLOW-UP (OUTPATIENT)
Dept: FAMILY MEDICINE CLINIC | Facility: CLINIC | Age: 45
End: 2025-04-30

## 2025-05-09 ENCOUNTER — OFFICE VISIT (OUTPATIENT)
Dept: FAMILY MEDICINE CLINIC | Facility: CLINIC | Age: 45
End: 2025-05-09
Payer: COMMERCIAL

## 2025-05-09 VITALS
HEART RATE: 82 BPM | SYSTOLIC BLOOD PRESSURE: 130 MMHG | RESPIRATION RATE: 18 BRPM | OXYGEN SATURATION: 97 % | DIASTOLIC BLOOD PRESSURE: 96 MMHG | HEIGHT: 72 IN | BODY MASS INDEX: 28.04 KG/M2 | WEIGHT: 207 LBS

## 2025-05-09 DIAGNOSIS — E55.9 HYPOVITAMINOSIS D: ICD-10-CM

## 2025-05-09 DIAGNOSIS — E11.9 TYPE 2 DIABETES MELLITUS WITHOUT COMPLICATION, WITHOUT LONG-TERM CURRENT USE OF INSULIN (HCC): ICD-10-CM

## 2025-05-09 DIAGNOSIS — Z00.00 ANNUAL PHYSICAL EXAM: Primary | ICD-10-CM

## 2025-05-09 DIAGNOSIS — B35.1 TOENAIL FUNGUS: ICD-10-CM

## 2025-05-09 PROCEDURE — 99213 OFFICE O/P EST LOW 20 MIN: CPT | Performed by: NURSE PRACTITIONER

## 2025-05-09 PROCEDURE — 99396 PREV VISIT EST AGE 40-64: CPT | Performed by: NURSE PRACTITIONER

## 2025-05-09 RX ORDER — CICLOPIROX 80 MG/ML
SOLUTION TOPICAL
Qty: 6 ML | Refills: 3 | Status: SHIPPED | OUTPATIENT
Start: 2025-05-09 | End: 2025-05-13 | Stop reason: SDUPTHER

## 2025-05-09 RX ORDER — DAPAGLIFLOZIN 10 MG/1
10 TABLET, FILM COATED ORAL DAILY
Qty: 90 TABLET | Refills: 3 | Status: SHIPPED | OUTPATIENT
Start: 2025-05-09 | End: 2025-05-13 | Stop reason: SDUPTHER

## 2025-05-09 NOTE — PROGRESS NOTES
Adult Annual Physical  Name: Mauricio Galvan      : 1980      MRN: [id number]  Encounter Provider: ODILIA Barillas  Encounter Date: 2025   Encounter department: [facility]    :  Assessment & Plan  Annual physical exam  Annual physical completed.  Discussed management of blood sugars.  Discussed self-care.  Tested positive for depression.  Education and encouragement provided  Orders:    CBC and differential; Future    Comprehensive metabolic panel; Future    Lipid panel; Future    Hypovitaminosis D    Orders:    Vitamin D 25 hydroxy; Future    Type 2 diabetes mellitus without complication, without long-term current use of insulin (HCC)    Lab Results   Component Value Date    HGBA1C 7.9 (H) 2025     Blood sugars are increasing.  Discussed management with diet.  Will add Farxiga continue with metformin.  Continue low carbohydrate diet continue with exercise activity  Orders:    CBC and differential; Future    Comprehensive metabolic panel; Future    Lipid panel; Future    Hemoglobin A1C; Future    TSH, 3rd generation with Free T4 reflex; Future    dapagliflozin (Farxiga) 10 MG tablet; Take 1 tablet (10 mg total) by mouth daily    Albumin / creatinine urine ratio; Future    Toenail fungus    Orders:    ciclopirox (PENLAC) 8 % solution; Apply topically daily at bedtime        Preventive Screenings:  - Diabetes Screening: screening not indicated and has diabetes  - Hepatitis C screening: screening up-to-date   - HIV screening: screening up-to-date   - Lung cancer screening: screening not indicated   - Prostate cancer screening: screening not indicated     Immunizations:  - Immunizations due: Prevnar 20    Counseling/Anticipatory Guidance:  - Alcohol: discussed moderation in alcohol intake and recommendations for healthy alcohol use.   - Drug use: discussed harms of illicit drug use and how it can negatively impact mental/physical health.   - Tobacco use: discussed harms  of tobacco use and management options for quitting.   - Dental health: discussed importance of regular tooth brushing, flossing, and dental visits.   - Sexual health: discussed sexually transmitted diseases, partner selection, use of condoms, avoidance of unintended pregnancy, and contraceptive alternatives.   - Diet: discussed recommendations for a healthy/well-balanced diet.   - Exercise: the importance of regular exercise/physical activity was discussed. Recommend exercise 3-5 times per week for at least 30 minutes.   - Injury prevention: discussed safety/seat belts, safety helmets, smoke detectors, carbon monoxide detectors, and smoking near bedding or upholstery.       Depression Screening and Follow-up Plan: Patient's depression screening was positive with a PHQ-2 score of 4. Their PHQ-9 score was 8.           History of Present Illness     Adult Annual Physical:  Patient presents for annual physical.     Diet and Physical Activity:    - Exercise: no formal exercise.    Depression Screening:  - PHQ-2 Score: 4  - PHQ-9 Score: 8    General Health:  - Sleep: sleeps poorly.  at home  - Hearing: normal hearing bilateral ears.  - Dental: no dental visits for > 1 year.     Health:  - History of STDs: no.   - Urinary symptoms: none.     Advanced Care Planning:  - Has an advanced directive?: no    - Has a durable medical POA?: no    - ACP document given to patient?: no      Review of Systems   Constitutional: Negative.  Negative for chills and fever.   HENT: Negative.  Negative for ear pain and sore throat.    Eyes: Negative.  Negative for pain and visual disturbance.   Respiratory: Negative.  Negative for cough and shortness of breath.    Cardiovascular: Negative.  Negative for chest pain and palpitations.   Gastrointestinal: Negative.  Negative for abdominal pain and vomiting.   Endocrine: Negative.    Genitourinary: Negative.  Negative for dysuria and hematuria.   Musculoskeletal: Negative.  Negative for  arthralgias and back pain.   Skin: Negative.  Negative for color change and rash.   Allergic/Immunologic: Negative.    Neurological: Negative.  Negative for seizures and syncope.   Hematological: Negative.    Psychiatric/Behavioral: Negative.     All other systems reviewed and are negative.        Objective   /96 (BP Location: Left arm, Patient Position: Sitting, Cuff Size: Large)   Pulse 82   Resp 18   Ht 6' (1.829 m)   Wt 93.9 kg (207 lb)   SpO2 97%   BMI 28.07 kg/m²     Physical Exam  Constitutional:       Appearance: He is well-developed.   HENT:      Head: Normocephalic and atraumatic.      Right Ear: External ear normal.      Left Ear: External ear normal.      Nose: Nose normal.      Mouth/Throat:      Pharynx: No oropharyngeal exudate.   Eyes:      Conjunctiva/sclera: Conjunctivae normal.      Pupils: Pupils are equal, round, and reactive to light.   Cardiovascular:      Rate and Rhythm: Normal rate and regular rhythm.      Pulses: no weak pulses.           Dorsalis pedis pulses are 2+ on the right side and 2+ on the left side.        Posterior tibial pulses are 2+ on the right side and 2+ on the left side.      Heart sounds: Normal heart sounds. No murmur heard.  Pulmonary:      Effort: Pulmonary effort is normal. No respiratory distress.      Breath sounds: Normal breath sounds. No wheezing.   Chest:      Chest wall: No tenderness.   Abdominal:      General: There is no distension.      Palpations: Abdomen is soft.      Tenderness: There is no abdominal tenderness.   Musculoskeletal:         General: No tenderness or deformity. Normal range of motion.      Cervical back: Normal range of motion and neck supple.   Feet:      Right foot:      Skin integrity: No ulcer, skin breakdown, erythema, warmth, callus or dry skin.      Left foot:      Skin integrity: No ulcer, skin breakdown, erythema, warmth, callus or dry skin.   Skin:     General: Skin is warm and dry.   Neurological:      Mental Status:  He is alert and oriented to person, place, and time.      Cranial Nerves: No cranial nerve deficit.      Sensory: No sensory deficit.      Motor: No abnormal muscle tone.      Coordination: Coordination normal.      Deep Tendon Reflexes: Reflexes normal.   Psychiatric:         Behavior: Behavior normal.         Thought Content: Thought content normal.         Judgment: Judgment normal.     Diabetic Foot Exam    Patient's shoes and socks removed.    Right Foot/Ankle   Right Foot Inspection  Skin Exam: skin normal and skin intact. No dry skin, no warmth, no callus, no erythema, no maceration, no abnormal color, no pre-ulcer, no ulcer and no callus.     Toe Exam: ROM and strength within normal limits.     Sensory   Vibration: intact  Proprioception: intact  Monofilament testing: intact    Vascular  Capillary refills: < 3 seconds  The right DP pulse is 2+. The right PT pulse is 2+.     Right Toe  - Comprehensive Exam  Ecchymosis: none  Arch: normal  Hammertoes: absent  Claw Toes: absent  Swelling: none   Tenderness: none       Left Foot/Ankle  Left Foot Inspection  Skin Exam: skin normal and skin intact. No dry skin, no warmth, no erythema, no maceration, normal color, no pre-ulcer, no ulcer and no callus.     Toe Exam: ROM and strength within normal limits.     Sensory   Vibration: intact  Proprioception: intact  Monofilament testing: intact    Vascular  Capillary refills: < 3 seconds  The left DP pulse is 2+. The left PT pulse is 2+.     Left Toe  - Comprehensive Exam  Ecchymosis: none  Arch: normal  Hammertoes: absent  Claw toes: absent  Swelling: none   Tenderness: none       Assign Risk Category  No deformity present  No loss of protective sensation  No weak pulses  Risk: 0

## 2025-05-09 NOTE — ASSESSMENT & PLAN NOTE
Annual physical completed.  Discussed management of blood sugars.  Discussed self-care.  Tested positive for depression.  Education and encouragement provided  Orders:    CBC and differential; Future    Comprehensive metabolic panel; Future    Lipid panel; Future

## 2025-05-09 NOTE — ASSESSMENT & PLAN NOTE
Lab Results   Component Value Date    HGBA1C 7.9 (H) 04/28/2025     Blood sugars are increasing.  Discussed management with diet.  Will add Farxiga continue with metformin.  Continue low carbohydrate diet continue with exercise activity  Orders:    CBC and differential; Future    Comprehensive metabolic panel; Future    Lipid panel; Future    Hemoglobin A1C; Future    TSH, 3rd generation with Free T4 reflex; Future    dapagliflozin (Farxiga) 10 MG tablet; Take 1 tablet (10 mg total) by mouth daily    Albumin / creatinine urine ratio; Future

## 2025-05-09 NOTE — PATIENT INSTRUCTIONS
"Patient Education     Routine physical for adults   The Basics   Written by the doctors and editors at [facility]   What is a physical? -- A physical is a routine visit, or \"check-up,\" with your doctor. You might also hear it called a \"wellness visit\" or \"preventive visit.\"  During each visit, the doctor will:   Ask about your physical and mental health   Ask about your habits, behaviors, and lifestyle   Do an exam   Give you vaccines if needed   Talk to you about any medicines you take   Give advice about your health   Answer your questions  Getting regular check-ups is an important part of taking care of your health. It can help your doctor find and treat any problems you have. But it's also important for preventing health problems.  A routine physical is different from a \"sick visit.\" A sick visit is when you see a doctor because of a health concern or problem. Since physicals are scheduled ahead of time, you can think about what you want to ask the doctor.  How often should I get a physical? -- It depends on your age and health. In general, for people age 21 years and older:   If you are younger than 50 years, you might be able to get a physical every 3 years.   If you are 50 years or older, your doctor might recommend a physical every year.  If you have an ongoing health condition, like diabetes or high blood pressure, your doctor will probably want to see you more often.  What happens during a physical? -- In general, each visit will include:   Physical exam - The doctor or nurse will check your height, weight, heart rate, and blood pressure. They will also look at your eyes and ears. They will ask about how you are feeling and whether you have any symptoms that bother you.   Medicines - It's a good idea to bring a list of all the medicines you take to each doctor visit. Your doctor will talk to you about your medicines and answer any questions. Tell them if you are having any side effects that bother you. You " "should also tell them if you are having trouble paying for any of your medicines.   Habits and behaviors - This includes:   Your diet   Your exercise habits   Whether you smoke, drink alcohol, or use drugs   Whether you are sexually active   Whether you feel safe at home  Your doctor will talk to you about things you can do to improve your health and lower your risk of health problems. They will also offer help and support. For example, if you want to quit smoking, they can give you advice and might prescribe medicines. If you want to improve your diet or get more physical activity, they can help you with this, too.   Lab tests, if needed - The tests you get will depend on your age and situation. For example, your doctor might want to check your:   Cholesterol   Blood sugar   Iron level   Vaccines - The recommended vaccines will depend on your age, health, and what vaccines you already had. Vaccines are very important because they can prevent certain serious or deadly infections.   Discussion of screening - \"Screening\" means checking for diseases or other health problems before they cause symptoms. Your doctor can recommend screening based on your age, risk, and preferences. This might include tests to check for:   Cancer, such as breast, prostate, cervical, ovarian, colorectal, prostate, lung, or skin cancer   Sexually transmitted infections, such as chlamydia and gonorrhea   Mental health conditions like depression and anxiety  Your doctor will talk to you about the different types of screening tests. They can help you decide which screenings to have. They can also explain what the results might mean.   Answering questions - The physical is a good time to ask the doctor or nurse questions about your health. If needed, they can refer you to other doctors or specialists, too.  Adults older than 65 years often need other care, too. As you get older, your doctor will talk to you about:   How to prevent falling at " home   Hearing or vision tests   Memory testing   How to take your medicines safely   Making sure that you have the help and support you need at home  All topics are updated as new evidence becomes available and our peer review process is complete.  This topic retrieved from [employer] on: May 02, 2024.  Topic 357832 Version 1.0  Release: 32.4.3 - C32.122  © 2024 UpToDate, Inc. and/or its affiliates. All rights reserved.  Consumer Information Use and Disclaimer   Disclaimer: This generalized information is a limited summary of diagnosis, treatment, and/or medication information. It is not meant to be comprehensive and should be used as a tool to help the user understand and/or assess potential diagnostic and treatment options. It does NOT include all information about conditions, treatments, medications, side effects, or risks that may apply to a specific patient. It is not intended to be medical advice or a substitute for the medical advice, diagnosis, or treatment of a health care provider based on the health care provider's examination and assessment of a patient's specific and unique circumstances. Patients must speak with a health care provider for complete information about their health, medical questions, and treatment options, including any risks or benefits regarding use of medications. This information does not endorse any treatments or medications as safe, effective, or approved for treating a specific patient. Up[employer]Date, Inc. and its affiliates disclaim any warranty or liability relating to this information or the use thereof.The use of this information is governed by the Terms of Use, available at https://www.[employer].com/en/know/clinical-effectiveness-terms. 2024© UpToDate, Inc. and its affiliates and/or licensors. All rights reserved.  Copyright   © 2024 UpToDate, Inc. and/or its affiliates. All rights reserved.

## 2025-05-13 DIAGNOSIS — B35.1 TOENAIL FUNGUS: ICD-10-CM

## 2025-05-13 DIAGNOSIS — E11.9 TYPE 2 DIABETES MELLITUS WITHOUT COMPLICATION, WITHOUT LONG-TERM CURRENT USE OF INSULIN (HCC): ICD-10-CM

## 2025-05-15 RX ORDER — DAPAGLIFLOZIN 10 MG/1
10 TABLET, FILM COATED ORAL DAILY
Qty: 90 TABLET | Refills: 0 | Status: SHIPPED | OUTPATIENT
Start: 2025-05-15 | End: 2026-05-10

## 2025-05-15 RX ORDER — CICLOPIROX 80 MG/ML
SOLUTION TOPICAL
Qty: 6 ML | Refills: 0 | Status: SHIPPED | OUTPATIENT
Start: 2025-05-15

## 2025-06-17 DIAGNOSIS — E11.9 TYPE 2 DIABETES MELLITUS WITHOUT COMPLICATION, WITHOUT LONG-TERM CURRENT USE OF INSULIN (HCC): ICD-10-CM

## 2025-06-17 NOTE — TELEPHONE ENCOUNTER
Medication: Continuous Glucose Sensor (FreeStyle Dov 2 Sensor)    Dose/Frequency: check BS multiple times a day     Quantity: 6    Pharmacy: [facility] #[id number] - [place] -     Office:   [x] PCP/Provider -   [] Speciality/Provider -     Does the patient have enough for 3 days?   [] Yes   [] No - Send as HP to POD